# Patient Record
Sex: FEMALE | Race: WHITE | Employment: OTHER | URBAN - METROPOLITAN AREA
[De-identification: names, ages, dates, MRNs, and addresses within clinical notes are randomized per-mention and may not be internally consistent; named-entity substitution may affect disease eponyms.]

---

## 2020-10-27 PROBLEM — Z90.710 H/O TOTAL HYSTERECTOMY: Status: ACTIVE | Noted: 2018-01-29

## 2020-10-27 PROBLEM — N32.81 OAB (OVERACTIVE BLADDER): Status: ACTIVE | Noted: 2020-10-27

## 2020-10-27 PROBLEM — K63.5 POLYP OF COLON: Status: ACTIVE | Noted: 2018-12-13

## 2020-10-27 PROBLEM — M85.80 OSTEOPENIA: Status: ACTIVE | Noted: 2020-10-27

## 2020-10-27 PROBLEM — I25.10 CORONARY ARTERY DISEASE INVOLVING NATIVE CORONARY ARTERY OF NATIVE HEART WITHOUT ANGINA PECTORIS: Status: ACTIVE | Noted: 2020-10-27

## 2020-10-27 PROBLEM — J43.2 CENTRILOBULAR EMPHYSEMA (HCC): Status: ACTIVE | Noted: 2019-11-05

## 2020-10-27 PROBLEM — E78.5 HYPERLIPIDEMIA: Status: ACTIVE | Noted: 2020-10-27

## 2020-10-27 RX ORDER — ATORVASTATIN CALCIUM 40 MG/1
40 TABLET, FILM COATED ORAL DAILY
COMMUNITY
Start: 2020-10-18 | End: 2021-04-05 | Stop reason: SDUPTHER

## 2020-10-27 RX ORDER — OXYBUTYNIN CHLORIDE 15 MG/1
15 TABLET, EXTENDED RELEASE ORAL EVERY EVENING
COMMUNITY
Start: 2020-10-05

## 2020-10-28 ENCOUNTER — OFFICE VISIT (OUTPATIENT)
Dept: PRIMARY CARE CLINIC | Age: 65
End: 2020-10-28
Payer: COMMERCIAL

## 2020-10-28 VITALS
SYSTOLIC BLOOD PRESSURE: 158 MMHG | WEIGHT: 132.8 LBS | HEART RATE: 74 BPM | BODY MASS INDEX: 23.53 KG/M2 | HEIGHT: 63 IN | RESPIRATION RATE: 16 BRPM | TEMPERATURE: 97.2 F | DIASTOLIC BLOOD PRESSURE: 87 MMHG

## 2020-10-28 PROCEDURE — 90471 IMMUNIZATION ADMIN: CPT | Performed by: FAMILY MEDICINE

## 2020-10-28 PROCEDURE — 90686 IIV4 VACC NO PRSV 0.5 ML IM: CPT | Performed by: FAMILY MEDICINE

## 2020-10-28 PROCEDURE — 99204 OFFICE O/P NEW MOD 45 MIN: CPT | Performed by: FAMILY MEDICINE

## 2020-10-28 RX ORDER — PNV NO.95/FERROUS FUM/FOLIC AC 28MG-0.8MG
1 TABLET ORAL DAILY
COMMUNITY

## 2020-10-28 RX ORDER — ALBUTEROL SULFATE 90 UG/1
2 AEROSOL, METERED RESPIRATORY (INHALATION) EVERY 4 HOURS PRN
COMMUNITY
Start: 2019-11-05 | End: 2021-12-15

## 2020-10-28 RX ORDER — ASPIRIN 81 MG/1
81 TABLET, CHEWABLE ORAL DAILY
COMMUNITY

## 2020-10-28 RX ORDER — DOXYCYCLINE 100 MG/1
100 TABLET ORAL EVERY 12 HOURS
COMMUNITY
Start: 2020-10-05 | End: 2022-10-14

## 2020-10-28 ASSESSMENT — ENCOUNTER SYMPTOMS
DIARRHEA: 0
BLOOD IN STOOL: 0
NAUSEA: 0
SINUS PAIN: 0
ABDOMINAL PAIN: 0
SINUS PRESSURE: 0
SHORTNESS OF BREATH: 0
CHEST TIGHTNESS: 0
WHEEZING: 0
CONSTIPATION: 0
SORE THROAT: 0
BACK PAIN: 0
RHINORRHEA: 0
VOMITING: 0
COUGH: 0

## 2020-10-28 NOTE — PROGRESS NOTES
Chief Complaint   Patient presents with    Establish Care         HPI:  Gagan Frazier is a 63 y.o. (: 1955) here today to establish care. H/o CAD, HLD, OAB, h/o tobacco use and lung nodules, emphysema and rosacea. Currently no concerns, would just like to establish and have a PCP as well as update HM. Denies CP, pressure, wheezing, SOB and abd pains. Believes she is due for her LDCT given smoking history and stable lung nodules. Last CT 2019. Urinary symptoms given OAB are tempered by oxybutynin. She urinates about 2x/night. Takes ASA, statin and fish oil to help decrease risk of MI and stroke. Believes that her rosacea is not getting better after trying the oral vitamins and topical meds. The ASCVD Risk score (Villa Lin, et al., 2013) failed to calculate for the following reasons:    Cannot find a previous HDL lab    Cannot find a previous total cholesterol lab    Review of Systems   Constitutional: Negative for activity change, appetite change, chills, fatigue, fever and unexpected weight change. HENT: Negative for congestion, postnasal drip, rhinorrhea, sinus pressure, sinus pain, sneezing and sore throat. Eyes: Negative for visual disturbance. Respiratory: Negative for cough, chest tightness, shortness of breath and wheezing. Cardiovascular: Negative for chest pain and palpitations. Gastrointestinal: Negative for abdominal pain, blood in stool, constipation, diarrhea, nausea and vomiting. Endocrine: Negative for cold intolerance, heat intolerance, polydipsia and polyuria. Genitourinary: Negative for dysuria, frequency, vaginal bleeding and vaginal discharge. Musculoskeletal: Negative for arthralgias, back pain, joint swelling, myalgias and neck pain. Skin: Negative for rash and wound. Allergic/Immunologic: Negative for environmental allergies.    Neurological: Negative for dizziness, tremors, syncope, weakness, light-headedness, numbness and headaches. Hematological: Negative for adenopathy. Psychiatric/Behavioral: Negative for behavioral problems, decreased concentration, sleep disturbance and suicidal ideas. The patient is not nervous/anxious. No past medical history on file. No family history on file. Social History     Tobacco Use    Smoking status: Former Smoker    Smokeless tobacco: Never Used   Substance Use Topics    Alcohol use: Not on file    Drug use: Not on file       New Prescriptions    No medications on file       Meds Prior to visit:  Current Outpatient Medications on File Prior to Visit   Medication Sig Dispense Refill    Ascorbic Acid (VITAMIN C PO) Take 1,000 mg by mouth daily      aspirin 81 MG chewable tablet Take 81 mg by mouth daily      albuterol sulfate  (90 Base) MCG/ACT inhaler Inhale 2 puffs into the lungs every 4 hours as needed      Calcium Carb-Cholecalciferol 600-800 MG-UNIT TABS Take 1 tablet by mouth daily      doxycycline monohydrate (ADOXA) 100 MG tablet Take 100 mg by mouth every 12 hours      Multiple Vitamins-Minerals (MULTIVITAMIN ADULT PO) Take 1 tablet by mouth daily      Omega-3 Fatty Acids (FISH OIL OMEGA-3) 1000 MG CAPS Take 1 capsule by mouth daily      cranberry 475 MG CAPS per capsule Take 4,200 mg by mouth daily      oxybutynin (DITROPAN XL) 15 MG extended release tablet Take 15 mg by mouth every evening      atorvastatin (LIPITOR) 40 MG tablet Take 40 mg by mouth daily       No current facility-administered medications on file prior to visit. Allergies   Allergen Reactions    Morphine And Related      vomiting         OBJECTIVE:  BP (!) 158/87   Pulse 74   Temp 97.2 °F (36.2 °C)   Resp 16   Ht 5' 2.5\" (1.588 m)   Wt 132 lb 12.8 oz (60.2 kg)   Breastfeeding No   BMI 23.90 kg/m²   BP Readings from Last 2 Encounters:   10/28/20 (!) 158/87     Wt Readings from Last 3 Encounters:   10/28/20 132 lb 12.8 oz (60.2 kg)       Physical Exam  Vitals signs reviewed. Motor: No weakness or abnormal muscle tone. Coordination: Coordination normal.      Gait: Gait normal.      Deep Tendon Reflexes: Reflexes normal.   Psychiatric:         Mood and Affect: Mood normal.         Behavior: Behavior normal.         Thought Content: Thought content normal.         Judgment: Judgment normal.           ASSESSMENT/PLAN:  1. Encounter to establish care  All questions answered. F/u discussed. Healthy lifestyle modifications discussed. 2. Physical exam  Update lipid panel and metabolic panel  VS reviewed and WNL    BMI reviewed   - Lipid Panel; Future  - Comprehensive Metabolic Panel; Future    3. Coronary artery disease involving native coronary artery of native heart without angina pectoris  Continue daily aspirin, statin and fish oil. Update renal function  - atorvastatin (LIPITOR) 40 MG tablet; Take 40 mg by mouth daily  - Comprehensive Metabolic Panel; Future  - aspirin 81 MG chewable tablet; Take 81 mg by mouth daily  - Omega-3 Fatty Acids (FISH OIL OMEGA-3) 1000 MG CAPS; Take 1 capsule by mouth daily    4. Mixed hyperlipidemia  Update lipid panel  Continue statin aspirin and fish oil  - atorvastatin (LIPITOR) 40 MG tablet; Take 40 mg by mouth daily  - Lipid Panel; Future  - aspirin 81 MG chewable tablet; Take 81 mg by mouth daily  - Omega-3 Fatty Acids (FISH OIL OMEGA-3) 1000 MG CAPS; Take 1 capsule by mouth daily    5. Lipid screening  Update lipid panel  - Lipid Panel; Future    6. OAB (overactive bladder)  Continue oxybutynin as prescribed. Briefly discussed increasing dose but she says she is fine at 50 mg  - oxybutynin (DITROPAN XL) 15 MG extended release tablet; Take 15 mg by mouth every evening    7. History of tobacco use  Does not smoke anymore. History of 2 lung nodules.   Per CT lungs on 9/13/2019 in care everywhere tab from [facility]:  NODULES, RIGHT:   *  Stable 3 mm subpleural nodule right middle lobe (series 2 image 198)  NODULES, LEFT:   *  Stable 4 mm nodule in the left upper lobe (image 101)  We will update LDCT per recommendations. - CT CHEST LOW DOSE (LDCT); Future    8. Lung nodules  As above  - CT CHEST LOW DOSE (LDCT); Future    9. Centrilobular emphysema ([address]  As above. Will refill albuterol  - albuterol sulfate  (90 Base) MCG/ACT inhaler; Inhale 2 puffs into the lungs every 4 hours as needed    10. Rosacea  Continue oral agents. Offered dermatology referral but politely declined at this time. - Calcium Carb-Cholecalciferol 600-800 MG-UNIT TABS; Take 1 tablet by mouth daily  - doxycycline monohydrate (ADOXA) 100 MG tablet; Take 100 mg by mouth every 12 hours    11. Immunization due  Administered  - INFLUENZA, QUADV, 3 YRS AND OLDER, IM PF, PREFILL SYR OR SDV, 0.5ML (AFLURIA QUADV, PF)    Discussed use, benefit, and side effects of prescribed medications. Barriers to medication compliance addressed. All patient questions answered. Pt voiced understanding. RTC Return in about 1 year (around 10/28/2021) for Physical .    No future appointments.     Kenzie Cabrera MD  10/28/2020  12:48 PM

## 2020-10-28 NOTE — PROGRESS NOTES
Vaccine Information Sheet, \"Influenza - Inactivated\"  given to Destiny Becerril, or parent/legal guardian of  Destiny Becerril and verbalized understanding. Patient responses:    Have you ever had a reaction to a flu vaccine? No  Do you have any current illness? No  Have you ever had Guillian [place] Syndrome? No  Do you have a serious allergy to any of the follow: Neomycin, Polymyxin, Thimerosal, eggs or egg products? No    Flu vaccine given per order. Please see immunization tab. Risks and benefits explained. Current VIS given.       Immunizations Administered     Name Date Dose Route    Influenza, Quadv, IM, PF (6 mo and older Fluzone, Flulaval, Fluarix, and 3 yrs and older Afluria) 10/28/2020 0.5 mL Intramuscular    Site: Deltoid- Left    Lot: [id number]    NDC: 06977-815-47

## 2020-10-30 ENCOUNTER — CLINICAL DOCUMENTATION (OUTPATIENT)
Dept: PRIMARY CARE CLINIC | Age: 65
End: 2020-10-30

## 2020-10-31 SDOH — HEALTH STABILITY: MENTAL HEALTH: HOW OFTEN DO YOU HAVE A DRINK CONTAINING ALCOHOL?: NEVER

## 2020-11-03 ENCOUNTER — TELEPHONE (OUTPATIENT)
Dept: PRIMARY CARE CLINIC | Age: 65
End: 2020-11-03

## 2020-11-03 NOTE — TELEPHONE ENCOUNTER
Pt called stating that a PA needs to be completed to do the CT CHEST LOW DOSE (LDCT) (Order [id number])     She said we need to start the process before she can have the scan done.

## 2020-11-13 ENCOUNTER — TELEPHONE (OUTPATIENT)
Dept: GYNECOLOGY | Age: 65
End: 2020-11-13

## 2020-11-13 NOTE — TELEPHONE ENCOUNTER
----- Message from Arvind Tran MD sent at 11/11/2020  4:52 PM EST -----  Regarding: Lab results  Please call patient with results:    - metabolic panel reveals normal electrolytes, kidney function and liver function. - lipid panel shows pretty good numbers for total cholesterol and LDL cholesterol.   - The HDL cholesterol is 39. This number we want to be a little higher. Walking 30 minutes, 3 times per week can help this. - alkaline phosphatase is mildly elevated. This can be due to many things like calcium metabolism and your liver/gallbladder.  - Because your liver and calcium are normal, we can continue to monitor this. - if you choose, the other option is to fractionate this number to see if this mild elevation is due to bone breakdown or your liver. This would be another blood test.   - otherwise we will recheck in 3 months. We can mail you your results if you'd like. Thank you!   Dr. Antonietta Ramos

## 2020-11-17 ENCOUNTER — TELEPHONE (OUTPATIENT)
Dept: PRIMARY CARE CLINIC | Age: 65
End: 2020-11-17

## 2020-11-18 ENCOUNTER — HOSPITAL ENCOUNTER (OUTPATIENT)
Dept: CT IMAGING | Age: 65
Discharge: HOME | End: 2020-11-18
Payer: COMMERCIAL

## 2020-11-18 PROCEDURE — 71250 CT THORAX DX C-: CPT

## 2020-11-19 NOTE — TELEPHONE ENCOUNTER
Called [employer]. Code for claim was not filed properly. It was made for a regular CT chest instead of a LDCT for screening. A new claim was made and approved. New case number: [id number]  Approval number: [id number]    Please let patient know this. Thank you so much!     Dr. Lana Khoury

## 2020-11-19 NOTE — TELEPHONE ENCOUNTER
Patient had CT Chest done yesterday and her insurance is requesting a peer to peer before they will approve the authorization request. Please contact [employer] at [phone], case # [id number]. Thank you.

## 2021-04-05 DIAGNOSIS — E78.2 MIXED HYPERLIPIDEMIA: ICD-10-CM

## 2021-04-05 DIAGNOSIS — I25.10 CORONARY ARTERY DISEASE INVOLVING NATIVE CORONARY ARTERY OF NATIVE HEART WITHOUT ANGINA PECTORIS: ICD-10-CM

## 2021-04-05 RX ORDER — ATORVASTATIN CALCIUM 40 MG/1
40 TABLET, FILM COATED ORAL DAILY
Qty: 30 TABLET | Refills: 0 | Status: SHIPPED | OUTPATIENT
Start: 2021-04-05 | End: 2021-04-12 | Stop reason: SDUPTHER

## 2021-04-05 NOTE — TELEPHONE ENCOUNTER
atorvastatin (LIPITOR) 40 MG tablet     [address], [address] [facility] - [address] [place] [address]

## 2021-04-12 DIAGNOSIS — I25.10 CORONARY ARTERY DISEASE INVOLVING NATIVE CORONARY ARTERY OF NATIVE HEART WITHOUT ANGINA PECTORIS: ICD-10-CM

## 2021-04-12 DIAGNOSIS — E78.2 MIXED HYPERLIPIDEMIA: ICD-10-CM

## 2021-04-12 RX ORDER — ATORVASTATIN CALCIUM 40 MG/1
40 TABLET, FILM COATED ORAL DAILY
Qty: 90 TABLET | Refills: 3 | Status: SHIPPED | OUTPATIENT
Start: 2021-04-12 | End: 2021-05-04

## 2021-05-04 RX ORDER — ATORVASTATIN CALCIUM 40 MG/1
40 TABLET, FILM COATED ORAL DAILY
Qty: 90 TABLET | Refills: 1 | Status: SHIPPED | OUTPATIENT
Start: 2021-05-04 | End: 2021-10-27 | Stop reason: SDUPTHER

## 2021-05-14 ENCOUNTER — TELEPHONE (OUTPATIENT)
Dept: PRIMARY CARE CLINIC | Age: 66
End: 2021-05-14

## 2021-05-14 DIAGNOSIS — R21 RASH: Primary | ICD-10-CM

## 2021-05-14 NOTE — TELEPHONE ENCOUNTER
Pt said she has had a rash for  A week now, she used hydrocortisone cream over the counter but its not working she would like to know if there is something you can call in for it   [address], [address] [facility] -  [phone]

## 2021-05-15 NOTE — TELEPHONE ENCOUNTER
Please let Denae Oneill know I sent some stronger steroid cream in for her. If this doesn't help, she should probably make an appt. Thank you!   Dr. Asia Edmond

## 2021-05-20 ENCOUNTER — TELEPHONE (OUTPATIENT)
Dept: PRIMARY CARE CLINIC | Age: 66
End: 2021-05-20

## 2021-05-20 NOTE — TELEPHONE ENCOUNTER
Tried to schedule patient for openings on Friday Dr Guanaco Bailey has, but patient can't do Virtual Visit or Office visit. Patient stated she is going to wait and use the cream she has now over the weekend. If it is not better, she will call the office Monday.

## 2021-08-26 NOTE — PROGRESS NOTES
visual disturbance. Respiratory: Negative for cough and shortness of breath. Cardiovascular: Negative for chest pain and palpitations. Gastrointestinal: Negative for abdominal pain, constipation and diarrhea. Genitourinary: Negative for difficulty urinating. Musculoskeletal: Negative for arthralgias. Skin: Negative for rash and wound. Neurological: Negative for dizziness, weakness, light-headedness and headaches. Hematological: Does not bruise/bleed easily. Psychiatric/Behavioral: Negative for behavioral problems. Prior to Visit Medications    Medication Sig Taking?  Authorizing Provider   atorvastatin (LIPITOR) 40 MG tablet Take 1 tablet by mouth daily  [place] Ventura MD   Ascorbic Acid (VITAMIN C PO) Take 1,000 mg by mouth daily  Historical Provider, MD   aspirin 81 MG chewable tablet Take 81 mg by mouth daily  Historical Provider, MD   albuterol sulfate  (90 Base) MCG/ACT inhaler Inhale 2 puffs into the lungs every 4 hours as needed  Historical Provider, MD   Calcium Carb-Cholecalciferol 600-800 MG-UNIT TABS Take 1 tablet by mouth daily  Historical Provider, MD   doxycycline monohydrate (ADOXA) 100 MG tablet Take 100 mg by mouth every 12 hours  Historical Provider, MD   Multiple Vitamins-Minerals (MULTIVITAMIN ADULT PO) Take 1 tablet by mouth daily  Historical Provider, MD   Omega-3 Fatty Acids (FISH OIL OMEGA-3) 1000 MG CAPS Take 1 capsule by mouth daily  Historical Provider, MD   cranberry 475 MG CAPS per capsule Take 4,200 mg by mouth daily  Historical Provider, MD   oxybutynin (DITROPAN XL) 15 MG extended release tablet Take 15 mg by mouth every evening  Historical Provider, MD       Social History     Tobacco Use    Smoking status: Former Smoker     Years: 35.00     Types: Cigarettes     Start date: 1973     Quit date: 2013     Years since quittin.2    Smokeless tobacco: Never Used   Substance Use Topics    Alcohol use: Never    Drug use: Never Allergies   Allergen Reactions    Morphine And Related      vomiting     ,   Past Medical History:   Diagnosis Date    CAD (coronary artery disease)     Angiogram 2016    Endometriosis     Former smoker     Hyperlipidemia, mixed     OAB (overactive bladder)     Osteopenia     ; Rpt DEXA    ,   Past Surgical History:   Procedure Laterality Date    CATARACT REMOVAL Right 2019     SECTION      DILATION AND CURETTAGE OF UTERUS      PARTIAL HYSTERECTOMY      2/2 postpartum bleeding   ,   Social History     Tobacco Use    Smoking status: Former Smoker     Years: 35.00     Types: Cigarettes     Start date: 1973     Quit date: 2013     Years since quittin.2    Smokeless tobacco: Never Used   Substance Use Topics    Alcohol use: Never    Drug use: Never   ,   Family History   Problem Relation Age of Onset    Lung Cancer Father 45    Atrial Fibrillation Brother    ,   Immunization History   Administered Date(s) Administered    COVID-19, J&J, PF, 0.5 mL 2021    Influenza Virus Vaccine 10/01/2007, 10/01/2008, 2011, 2013, 10/27/2014, 2015, 2016, 2017    Influenza Whole 2011    Influenza, Quadv, IM, PF (6 mo and older Fluzone, Flulaval, Fluarix, and 3 yrs and older Afluria) 10/28/2020    Pneumococcal Polysaccharide (Benloupdh46) 2017    Tdap (Boostrix, Adacel) 2012    Zoster Live (Zostavax) 10/13/2017   ,   Health Maintenance   Topic Date Due    Hepatitis C screen  Never done    HIV screen  Never done    Lipid screen  Never done    Shingles Vaccine (3 of 3) 2021    Flu vaccine (1) 2021    Breast cancer screen  2022    DTaP/Tdap/Td vaccine (2 - Td or Tdap) 2022    Pneumococcal 0-64 years Vaccine (2 of 2 - PPSV23) 2022    Colon cancer screen colonoscopy  2031    COVID-19 Vaccine  Completed    Hepatitis A vaccine  Aged Out    Hepatitis B vaccine  Aged Out    Hib vaccine Aged Out    Meningococcal (ACWY) vaccine  Aged Out       PHYSICAL EXAMINATION:  [ INSTRUCTIONS:  \"[x]\" Indicates a positive item  \"[]\" Indicates a negative item  -- DELETE ALL ITEMS NOT EXAMINED]  [x] Alert  [x] Oriented to person/place/time    [x] No apparent distress  [] Toxic appearing    [] Face flushed appearing [x] Sclera clear  [] Lips are cyanotic      [x] Breathing appears normal  [] Appears tachypneic      [] Rash on visible skin    [x] Cranial Nerves II-XII grossly intact    [] Motor grossly intact in visible upper extremities    [] Motor grossly intact in visible lower extremities    [x] Normal Mood  [] Anxious appearing    [] Depressed appearing  [] Confused appearing        Due to this being a TeleHealth encounter, evaluation of the following organ systems is limited: Vitals/Constitutional/EENT/Resp/CV/GI//MS/Neuro/Skin/Heme-Lymph-Imm. ASSESSMENT/PLAN:  1. SOB (shortness of breath)  History of shortness of breath, diagnosed with emphysema/COPD in 2019 per PFT and it was recommended for her to follow-up in 1 year for repeat PFTs. Has never used the albuterol that she was previously prescribed because she did not think she needed it  Since her shortness of breath is increasing in severity and frequency, she would like a new referral to a [facility] pulmonologist.  - Matias Sumner MD, Pulmonary, [facility]    2. Centrilobular emphysema (HCC)  As above  Shortness of breath likely due to this. Referral placed  Politely offered albuterol refill but she declined and would like to follow-up with pulmonology first  - Matias Sumner MD, Pulmonary, [facility]    3. Pulmonary nodules  Stable her last pulmonary visit on CT chest  Recommended follow-up  Referral placed  We will follow up recommendations  - Matias Sumner MD, Pulmonary, [facility]    4. Nausea  Offered Zofran but patient politely declined. She is not too worried about this.   We will try to make

## 2021-08-27 ENCOUNTER — VIRTUAL VISIT (OUTPATIENT)
Dept: PRIMARY CARE CLINIC | Age: 66
End: 2021-08-27
Payer: COMMERCIAL

## 2021-08-27 DIAGNOSIS — R11.0 NAUSEA: ICD-10-CM

## 2021-08-27 DIAGNOSIS — R06.02 SOB (SHORTNESS OF BREATH): Primary | ICD-10-CM

## 2021-08-27 DIAGNOSIS — J43.2 CENTRILOBULAR EMPHYSEMA (HCC): ICD-10-CM

## 2021-08-27 DIAGNOSIS — R91.8 PULMONARY NODULES: ICD-10-CM

## 2021-08-27 PROCEDURE — 3017F COLORECTAL CA SCREEN DOC REV: CPT | Performed by: FAMILY MEDICINE

## 2021-08-27 PROCEDURE — VIRTUALHLTH VIRTUAL HEALTH SAME DAY: Performed by: FAMILY MEDICINE

## 2021-08-27 PROCEDURE — 1036F TOBACCO NON-USER: CPT | Performed by: FAMILY MEDICINE

## 2021-08-27 PROCEDURE — 3023F SPIROM DOC REV: CPT | Performed by: FAMILY MEDICINE

## 2021-08-27 PROCEDURE — G8926 SPIRO NO PERF OR DOC: HCPCS | Performed by: FAMILY MEDICINE

## 2021-08-27 PROCEDURE — G8420 CALC BMI NORM PARAMETERS: HCPCS | Performed by: FAMILY MEDICINE

## 2021-08-27 PROCEDURE — G8427 DOCREV CUR MEDS BY ELIG CLIN: HCPCS | Performed by: FAMILY MEDICINE

## 2021-08-27 PROCEDURE — 99214 OFFICE O/P EST MOD 30 MIN: CPT | Performed by: FAMILY MEDICINE

## 2021-08-27 ASSESSMENT — ENCOUNTER SYMPTOMS
CONSTIPATION: 0
COUGH: 0
ABDOMINAL PAIN: 0
EYE PAIN: 0
DIARRHEA: 0
SHORTNESS OF BREATH: 0

## 2021-09-16 ENCOUNTER — OFFICE VISIT (OUTPATIENT)
Dept: PULMONOLOGY | Age: 66
End: 2021-09-16
Payer: COMMERCIAL

## 2021-09-16 VITALS
OXYGEN SATURATION: 95 % | BODY MASS INDEX: 24.27 KG/M2 | TEMPERATURE: 96.5 F | DIASTOLIC BLOOD PRESSURE: 66 MMHG | HEIGHT: 63 IN | WEIGHT: 137 LBS | SYSTOLIC BLOOD PRESSURE: 130 MMHG | HEART RATE: 71 BPM

## 2021-09-16 DIAGNOSIS — R91.8 PULMONARY NODULES: ICD-10-CM

## 2021-09-16 DIAGNOSIS — R06.09 DOE (DYSPNEA ON EXERTION): Primary | ICD-10-CM

## 2021-09-16 DIAGNOSIS — J44.9 COPD, MILD (HCC): ICD-10-CM

## 2021-09-16 LAB
ALBUMIN SERPL-MCNC: 4.2 G/DL (ref 3.4–5)
ANION GAP SERPL CALCULATED.3IONS-SCNC: 15 MMOL/L (ref 3–16)
BASOPHILS ABSOLUTE: 0.1 K/UL (ref 0–0.2)
BASOPHILS RELATIVE PERCENT: 1 %
BUN BLDV-MCNC: 14 MG/DL (ref 7–20)
CALCIUM SERPL-MCNC: 9.7 MG/DL (ref 8.3–10.6)
CHLORIDE BLD-SCNC: 101 MMOL/L (ref 99–110)
CO2: 23 MMOL/L (ref 21–32)
CREAT SERPL-MCNC: 0.7 MG/DL (ref 0.6–1.2)
EOSINOPHILS ABSOLUTE: 0.2 K/UL (ref 0–0.6)
EOSINOPHILS RELATIVE PERCENT: 3.6 %
GFR AFRICAN AMERICAN: >60
GFR NON-AFRICAN AMERICAN: >60
GLUCOSE BLD-MCNC: 92 MG/DL (ref 70–99)
HCT VFR BLD CALC: 39.8 % (ref 36–48)
HEMOGLOBIN: 13 G/DL (ref 12–16)
LYMPHOCYTES ABSOLUTE: 1.6 K/UL (ref 1–5.1)
LYMPHOCYTES RELATIVE PERCENT: 25.8 %
MCH RBC QN AUTO: 28.2 PG (ref 26–34)
MCHC RBC AUTO-ENTMCNC: 32.8 G/DL (ref 31–36)
MCV RBC AUTO: 85.9 FL (ref 80–100)
MONOCYTES ABSOLUTE: 0.8 K/UL (ref 0–1.3)
MONOCYTES RELATIVE PERCENT: 13.2 %
NEUTROPHILS ABSOLUTE: 3.4 K/UL (ref 1.7–7.7)
NEUTROPHILS RELATIVE PERCENT: 56.4 %
PDW BLD-RTO: 15.9 % (ref 12.4–15.4)
PHOSPHORUS: 3.7 MG/DL (ref 2.5–4.9)
PLATELET # BLD: 258 K/UL (ref 135–450)
PMV BLD AUTO: 9 FL (ref 5–10.5)
POTASSIUM SERPL-SCNC: 4.1 MMOL/L (ref 3.5–5.1)
RBC # BLD: 4.63 M/UL (ref 4–5.2)
SODIUM BLD-SCNC: 139 MMOL/L (ref 136–145)
TSH SERPL DL<=0.05 MIU/L-ACNC: 2.6 UIU/ML (ref 0.27–4.2)
WBC # BLD: 6.1 K/UL (ref 4–11)

## 2021-09-16 PROCEDURE — 3023F SPIROM DOC REV: CPT | Performed by: INTERNAL MEDICINE

## 2021-09-16 PROCEDURE — G8427 DOCREV CUR MEDS BY ELIG CLIN: HCPCS | Performed by: INTERNAL MEDICINE

## 2021-09-16 PROCEDURE — 1036F TOBACCO NON-USER: CPT | Performed by: INTERNAL MEDICINE

## 2021-09-16 PROCEDURE — 99204 OFFICE O/P NEW MOD 45 MIN: CPT | Performed by: INTERNAL MEDICINE

## 2021-09-16 PROCEDURE — G8926 SPIRO NO PERF OR DOC: HCPCS | Performed by: INTERNAL MEDICINE

## 2021-09-16 PROCEDURE — 3017F COLORECTAL CA SCREEN DOC REV: CPT | Performed by: INTERNAL MEDICINE

## 2021-09-16 PROCEDURE — G8420 CALC BMI NORM PARAMETERS: HCPCS | Performed by: INTERNAL MEDICINE

## 2021-09-16 ASSESSMENT — ENCOUNTER SYMPTOMS
SHORTNESS OF BREATH: 1
WHEEZING: 0
CHEST TIGHTNESS: 0
COUGH: 0

## 2021-09-16 NOTE — PROGRESS NOTES
[facility]    Outpatient Note    Subjective:   CHIEF COMPLAINT: No chief complaint on file. HPI:     The patient is 61 y.o. female who presents today for a new patient visit for evaluation of SOB. It is mainly with exertion. It started 2 years ago. At that time she was found to have nodules. Over the past few months she noticed more SOB with carrying stuff or going up steps. There is no cough or sputum production. There is no fever or chills. There is no chest pain or palpitations. Otherwise she feels good. No prior hx of heart disease, anemia or thyroid disease. Several years she had pneumonia. She was not admitted for it. She quit smoking in . She started smoking at the age of 14 and smoked 1 PPD. She can walk 3 flights of stairs but gets really SOB and has to sit.       Past Medical History:    Past Medical History:   Diagnosis Date    CAD (coronary artery disease)     Angiogram     Endometriosis     Former smoker     Hyperlipidemia, mixed     OAB (overactive bladder)     Osteopenia     ; Rpt DEXA        Social History:    Social History     Tobacco Use   Smoking Status Former Smoker    Years: 35.00    Types: Cigarettes    Start date: 1973   Jesse Johnson Quit date: 2013    Years since quittin.2   Smokeless Tobacco Never Used       Family History:  Family History   Problem Relation Age of Onset    Lung Cancer Father 45    Atrial Fibrillation Brother        Current Medications:  Current Outpatient Medications on File Prior to Visit   Medication Sig Dispense Refill    Ascorbic Acid (VITAMIN C PO) Take 1,000 mg by mouth daily      aspirin 81 MG chewable tablet Take 81 mg by mouth daily      Calcium Carb-Cholecalciferol 600-800 MG-UNIT TABS Take 1 tablet by mouth daily      doxycycline monohydrate (ADOXA) 100 MG tablet Take 100 mg by mouth every 12 hours      Multiple Vitamins-Minerals (MULTIVITAMIN ADULT PO) Take 1 tablet by mouth daily      Omega-3 Fatty Acids (FISH OIL OMEGA-3) 1000 MG CAPS Take 1 capsule by mouth daily      cranberry 475 MG CAPS per capsule Take 4,200 mg by mouth daily      oxybutynin (DITROPAN XL) 15 MG extended release tablet Take 15 mg by mouth every evening      atorvastatin (LIPITOR) 40 MG tablet Take 1 tablet by mouth daily 90 tablet 1    albuterol sulfate  (90 Base) MCG/ACT inhaler Inhale 2 puffs into the lungs every 4 hours as needed (Patient not taking: Reported on 9/16/2021)       No current facility-administered medications on file prior to visit. REVIEW OF SYSTEMS:    Review of Systems   Constitutional: Negative for chills, fatigue, fever and unexpected weight change. HENT: Negative for congestion. Respiratory: Positive for shortness of breath (on exertion ). Negative for cough, chest tightness and wheezing. Cardiovascular: Negative for chest pain, palpitations and leg swelling. Neurological: Negative for weakness. Psychiatric/Behavioral: Negative for sleep disturbance. The patient is not nervous/anxious. All other systems reviewed and are negative. Objective:   PHYSICAL EXAM:        VITALS:  /66 (Site: Right Upper Arm, Position: Sitting, Cuff Size: Medium Adult)   Pulse 71   Temp 96.5 °F (35.8 °C) (Infrared)   Ht 5' 3\" (1.6 m)   Wt 137 lb (62.1 kg)   SpO2 95%   Breastfeeding No   BMI 24.27 kg/m²     Physical Exam  Vitals reviewed. Constitutional:       Appearance: She is well-developed. HENT:      Head: Normocephalic and atraumatic. Eyes:      Pupils: Pupils are equal, round, and reactive to light. Neck:      Vascular: No JVD. Cardiovascular:      Rate and Rhythm: Normal rate and regular rhythm. Heart sounds: No murmur heard. Pulmonary:      Effort: Pulmonary effort is normal. No respiratory distress. Breath sounds: Normal breath sounds. No stridor. No wheezing or rales.    Abdominal:      General: Bowel sounds are normal.      Palpations: Abdomen is soft. Musculoskeletal:         General: No deformity. Cervical back: Neck supple. Right lower leg: No edema. Left lower leg: No edema. Skin:     General: Skin is warm and dry. Neurological:      Mental Status: She is alert and oriented to person, place, and time. Psychiatric:         Behavior: Behavior normal.         DATA:    11/18/20  1. Category 2, benign appearance or behavior.  Continue annual screening in 12 months.       LUNG RADS ASSESSMENTS       LUNG RADS S Modifier     PFT on 10/11/2019  5:19 PM   PFT TEST     Spirometry   Spirometry shows mild obstructive defect. .     Bronchodilator   There is no response to bronchodilator demonstrated. Flow-Volume Loop   The flow-volume loop is compatible with obstructive lung defect. Lung Volumes   Lung volumes are normal.     Lung Volume Comments   There is no air trapping or hyperinflation present. Diffusing Capacity   DLCO is  moderately reduced even when adjusted for [place]. Airway Resistance   Airway resistance is within normal limits. PFT is compatible with MILD     Obstructive Lung Disease  - Possible etiologies include: asthma,   COPD, bronchiectasis, bronchitis. Correlate clinically. Assessment:      Diagnosis Orders   1. BARON (dyspnea on exertion)  CBC Auto Differential    Renal Function Panel    TSH without Reflex   2. COPD, mild ([address]  Full PFT Study With Bronchodilator    Carbon Monoxide Diffusing Capacity   3. Pulmonary nodules  CT Lung Screen (Initial or Annual)       Plan:   61year old female, ex smoker, quit smoking in 2013 is here for evaluation of BARON. She noticed it 1st two years ago, however over the past few months it appears to be more than usual.      Prior PFT on 10/2019 with mild obstruction. She was prescribed albuterol but she doesn't use it.     Prior CT scan in 2020 with stable pulmonary nodules    There is no chest pain or palpitations with exertion  No hx of anemia, renal disease or thyroid disease. Plan   PFT  LDCT.   If nodules are stable no further f/u required  CBC, BMP and TSH  If this work up is negative consider stress test

## 2021-09-20 DIAGNOSIS — J06.9 UPPER RESPIRATORY TRACT INFECTION, UNSPECIFIED TYPE: Primary | ICD-10-CM

## 2021-09-20 RX ORDER — AZITHROMYCIN 250 MG/1
250 TABLET, FILM COATED ORAL SEE ADMIN INSTRUCTIONS
Qty: 6 TABLET | Refills: 0 | Status: SHIPPED | OUTPATIENT
Start: 2021-09-20 | End: 2021-09-25

## 2021-09-21 ENCOUNTER — TELEPHONE (OUTPATIENT)
Dept: PRIMARY CARE CLINIC | Age: 66
End: 2021-09-21

## 2021-09-21 NOTE — TELEPHONE ENCOUNTER
----- Message from Kelsea Blackburn sent at 9/20/2021  8:39 AM EDT -----  Subject: Message to Provider    QUESTIONS  Information for Provider? Patient started over the weekend with stuffy   nose, sinus drainage, cough. No fever, diarrhea and ear pain this morning. [facility] [phone]  ---------------------------------------------------------------------------  --------------  Reilly SAMUELS  What is the best way for the office to contact you? OK to leave message on   voicemail  Preferred Call Back Phone Number? [phone]  ---------------------------------------------------------------------------  --------------  SCRIPT ANSWERS  Relationship to Patient?  Self

## 2021-09-28 ENCOUNTER — TELEPHONE (OUTPATIENT)
Dept: PULMONOLOGY | Age: 66
End: 2021-09-28

## 2021-09-28 NOTE — TELEPHONE ENCOUNTER
Julien from [employer] called to say that Iván Willingham is scheduled for CT Lung screening tomorrow and it was denied by insurance. If this needs to be done now you can do a peer to peer, he has the info. If it can wait 1 yr from last scan they will just cancel ans reschedule it. Please Advise. Julien can be reached at [phone].

## 2021-10-25 DIAGNOSIS — I25.10 CORONARY ARTERY DISEASE INVOLVING NATIVE CORONARY ARTERY OF NATIVE HEART WITHOUT ANGINA PECTORIS: ICD-10-CM

## 2021-10-25 DIAGNOSIS — E78.2 MIXED HYPERLIPIDEMIA: ICD-10-CM

## 2021-10-25 NOTE — TELEPHONE ENCOUNTER
atorvastatin (LIPITOR) 40 MG tablet     [facility], [address] [phone]

## 2021-10-27 RX ORDER — ATORVASTATIN CALCIUM 40 MG/1
40 TABLET, FILM COATED ORAL DAILY
Qty: 90 TABLET | Refills: 1 | Status: SHIPPED | OUTPATIENT
Start: 2021-10-27 | End: 2021-11-05 | Stop reason: SDUPTHER

## 2021-10-27 NOTE — TELEPHONE ENCOUNTER
Medication:   Requested Prescriptions     Pending Prescriptions Disp Refills    atorvastatin (LIPITOR) 40 MG tablet 90 tablet 1     Sig: Take 1 tablet by mouth daily        Last Filled:  05/04/21    Patient Phone Number: [phone] (home)     Last appt: 8/27/2021 No follow-ups on file. Next appt: Visit date not found    Last OARRS: No flowsheet data found.

## 2021-11-05 RX ORDER — ATORVASTATIN CALCIUM 40 MG/1
40 TABLET, FILM COATED ORAL DAILY
Qty: 90 TABLET | Refills: 1 | Status: SHIPPED | OUTPATIENT
Start: 2021-11-05 | End: 2022-05-11

## 2021-11-05 NOTE — TELEPHONE ENCOUNTER
Medication:   Requested Prescriptions     Pending Prescriptions Disp Refills    atorvastatin (LIPITOR) 40 MG tablet 90 tablet 1     Sig: Take 1 tablet by mouth daily     Signed Prescriptions Disp Refills    atorvastatin (LIPITOR) 40 MG tablet 90 tablet 1     Sig: Take 1 tablet by mouth daily     Authorizing Provider: Harlan Otoole        Last Filled:  Sent in to wrong pharm     Patient Phone Number: [phone] (home)     Last appt: 8/27/2021   Next appt: Visit date not found    Last OARRS: No flowsheet data found.

## 2021-11-10 ENCOUNTER — TELEPHONE (OUTPATIENT)
Dept: PULMONOLOGY | Age: 66
End: 2021-11-10

## 2021-11-10 DIAGNOSIS — R06.02 SOB (SHORTNESS OF BREATH): Primary | ICD-10-CM

## 2021-11-23 ENCOUNTER — HOSPITAL ENCOUNTER (OUTPATIENT)
Dept: CT IMAGING | Age: 66
Discharge: HOME | End: 2021-11-23
Payer: COMMERCIAL

## 2021-11-23 ENCOUNTER — HOSPITAL ENCOUNTER (OUTPATIENT)
Dept: PULMONOLOGY | Age: 66
Discharge: HOME | End: 2021-11-23
Payer: COMMERCIAL

## 2021-11-23 DIAGNOSIS — R91.8 PULMONARY NODULES: ICD-10-CM

## 2021-11-23 DIAGNOSIS — R06.02 SOB (SHORTNESS OF BREATH): ICD-10-CM

## 2021-11-23 PROCEDURE — 94060 EVALUATION OF WHEEZING: CPT

## 2021-11-23 PROCEDURE — 6360000002 HC RX W HCPCS: Performed by: INTERNAL MEDICINE

## 2021-11-23 PROCEDURE — 94726 PLETHYSMOGRAPHY LUNG VOLUMES: CPT

## 2021-11-23 PROCEDURE — 94729 DIFFUSING CAPACITY: CPT

## 2021-11-23 PROCEDURE — 71271 CT THORAX LUNG CANCER SCR C-: CPT

## 2021-11-23 PROCEDURE — 94761 N-INVAS EAR/PLS OXIMETRY MLT: CPT

## 2021-11-23 PROCEDURE — 94664 DEMO&/EVAL PT USE INHALER: CPT

## 2021-11-23 RX ORDER — ALBUTEROL SULFATE 2.5 MG/3ML
2.5 SOLUTION RESPIRATORY (INHALATION) ONCE
Status: COMPLETED | OUTPATIENT
Start: 2021-11-23 | End: 2021-11-23

## 2021-11-23 RX ADMIN — ALBUTEROL SULFATE 2.5 MG: 2.5 SOLUTION RESPIRATORY (INHALATION) at 15:03

## 2021-11-29 ENCOUNTER — TELEPHONE (OUTPATIENT)
Dept: CT IMAGING | Age: 66
End: 2021-11-29

## 2021-11-30 ENCOUNTER — TELEPHONE (OUTPATIENT)
Dept: PULMONOLOGY | Age: 66
End: 2021-11-30

## 2021-11-30 DIAGNOSIS — R06.09 DOE (DYSPNEA ON EXERTION): Primary | ICD-10-CM

## 2021-11-30 NOTE — PROCEDURES
[address]               [address]                               PULMONARY FUNCTION    PATIENT NAME: Samantha Arriaga                     :        1955  MED REC NO:   [id number]                          ROOM:  ACCOUNT NO:   [de-identified]                           ADMIT DATE: 2021  PROVIDER:     Godfrey Oconnell MD    DATE OF PROCEDURE:  2021    FINDINGS:  Spirometry on this patient shows an FEV1 of 1.78 which is 76%  of predicted and a forced vital capacity of 2.55 which is 83% of  predicted giving a ratio of 70. There was no significant response to  bronchodilators. Lung volumes show a normal total lung capacity at 82%. Diffusion capacity was moderately decreased even with correction for  alveolar ventilation. CONCLUSION:  Moderate obstructive defect based on lower limit of normal  values. No evidence of hyperinflation or air trapping and moderately  decreased diffusion. Findings could be consistent with a diagnosis of  COPD. Clinical correlation will be needed.         Brisa Olivares MD    D: 2021 16:37:43       T: 2021 18:47:17     [email or web address]  Job#: [id number]     Doc#: [id number]

## 2021-11-30 NOTE — TELEPHONE ENCOUNTER
Patient calling about results for PFT and CT scan. Does she need a follow up appt or are you able to call with results ?   Can be reached at [phone]

## 2021-12-15 ENCOUNTER — HOSPITAL ENCOUNTER (OUTPATIENT)
Age: 66
Discharge: HOME | End: 2021-12-15
Payer: COMMERCIAL

## 2021-12-15 ENCOUNTER — HOSPITAL ENCOUNTER (OUTPATIENT)
Dept: GENERAL RADIOLOGY | Age: 66
Discharge: HOME | End: 2021-12-15
Payer: COMMERCIAL

## 2021-12-15 ENCOUNTER — OFFICE VISIT (OUTPATIENT)
Dept: PRIMARY CARE CLINIC | Age: 66
End: 2021-12-15
Payer: COMMERCIAL

## 2021-12-15 VITALS
DIASTOLIC BLOOD PRESSURE: 59 MMHG | SYSTOLIC BLOOD PRESSURE: 121 MMHG | WEIGHT: 138.8 LBS | BODY MASS INDEX: 24.59 KG/M2 | HEART RATE: 80 BPM | TEMPERATURE: 92.5 F

## 2021-12-15 DIAGNOSIS — M65.341 TRIGGER RING FINGER OF RIGHT HAND: ICD-10-CM

## 2021-12-15 DIAGNOSIS — M79.641 RIGHT HAND PAIN: Primary | ICD-10-CM

## 2021-12-15 DIAGNOSIS — M79.641 RIGHT HAND PAIN: ICD-10-CM

## 2021-12-15 PROCEDURE — G8427 DOCREV CUR MEDS BY ELIG CLIN: HCPCS | Performed by: FAMILY MEDICINE

## 2021-12-15 PROCEDURE — 3017F COLORECTAL CA SCREEN DOC REV: CPT | Performed by: FAMILY MEDICINE

## 2021-12-15 PROCEDURE — 73130 X-RAY EXAM OF HAND: CPT

## 2021-12-15 PROCEDURE — G8482 FLU IMMUNIZE ORDER/ADMIN: HCPCS | Performed by: FAMILY MEDICINE

## 2021-12-15 PROCEDURE — 99214 OFFICE O/P EST MOD 30 MIN: CPT | Performed by: FAMILY MEDICINE

## 2021-12-15 PROCEDURE — G8420 CALC BMI NORM PARAMETERS: HCPCS | Performed by: FAMILY MEDICINE

## 2021-12-15 PROCEDURE — 1036F TOBACCO NON-USER: CPT | Performed by: FAMILY MEDICINE

## 2021-12-15 ASSESSMENT — ENCOUNTER SYMPTOMS
SHORTNESS OF BREATH: 0
EYE PAIN: 0
ABDOMINAL PAIN: 0
CONSTIPATION: 0
COUGH: 0
DIARRHEA: 0

## 2021-12-15 ASSESSMENT — PATIENT HEALTH QUESTIONNAIRE - PHQ9
SUM OF ALL RESPONSES TO PHQ9 QUESTIONS 1 & 2: 0
2. FEELING DOWN, DEPRESSED OR HOPELESS: 0
SUM OF ALL RESPONSES TO PHQ QUESTIONS 1-9: 0
SUM OF ALL RESPONSES TO PHQ QUESTIONS 1-9: 0
1. LITTLE INTEREST OR PLEASURE IN DOING THINGS: 0
SUM OF ALL RESPONSES TO PHQ QUESTIONS 1-9: 0

## 2021-12-15 NOTE — PROGRESS NOTES
Chief Complaint   Patient presents with    Other     hand xray ? hands become numb at night hard to open up          Assessment/Plan:  Brigitte Gaffney is a 61 y.o. female with who presents to clinic for right hand pain and some numbness and tingling as well as ring finger \"sticking\"    1. Right hand pain  We will follow-up x-ray to rule out any osteoarthritis or joint degeneration  May be secondary to ulnar nerve compression in wrist from overuse  Seems to resolve throughout the day  Has not tried any NSAIDs but recommend this at least before bed to see if this helps with symptoms in the morning  - XR HAND RIGHT (MIN 3 VIEWS); Future    2. Trigger ring finger of right hand  As above  We will follow-up x-ray to rule out any osteoarthritis or joint degeneration  May send to Dr. Caroline Tran for evaluation and possible injection of trigger finger  - XR HAND RIGHT (MIN 3 VIEWS); Future        History of Present Ilness:  Brigitte Gaffnye is a 61 y.o. female who presents to clinic with right hand pain. Onset: 1-1.5 months ago  Progression: worse to stable  Quality: sharp pains in the AM only with some numbness and tingling  Radiation: From middle of hand to ring finger and pinky  Severity: Worse in the morning until she starts moving around and then it dissipates until it completely resolves, until the next morning    Review of Systems   Constitutional: Negative for appetite change, chills, fatigue and fever. HENT: Negative for congestion. Eyes: Negative for pain and visual disturbance. Respiratory: Negative for cough and shortness of breath. Cardiovascular: Negative for chest pain and palpitations. Gastrointestinal: Negative for abdominal pain, constipation and diarrhea. Genitourinary: Negative for difficulty urinating. Musculoskeletal: Negative for arthralgias. Skin: Negative for rash and wound. Neurological: Negative for dizziness, weakness, light-headedness and headaches.    Hematological: Does not bruise/bleed easily. Psychiatric/Behavioral: Negative for behavioral problems. Pertinent Past medical, family, and social histories are reviewed and updated in the chart. Past Medical History:   Diagnosis Date    CAD (coronary artery disease)     Angiogram 2016    Endometriosis     Former smoker     Hyperlipidemia, mixed     OAB (overactive bladder)     Osteopenia     ; Rpt DEXA      Past Surgical History:   Procedure Laterality Date    CATARACT REMOVAL Right 2019     SECTION      DILATION AND CURETTAGE OF UTERUS      PARTIAL HYSTERECTOMY      2/2 postpartum bleeding       Medications:   Med list was reviewed/updated in the chart. Allergies   Allergen Reactions    Codeine     Morphine And Related      vomiting         Physical Examination:  BP (!) 121/59 (Site: Left Upper Arm, Position: Sitting, Cuff Size: Large Adult)   Pulse 80   Temp 92.5 °F (33.6 °C) (Temporal)   Wt 138 lb 12.8 oz (63 kg)   BMI 24.59 kg/m²   Physical Exam  Vitals reviewed. Constitutional:       General: She is not in acute distress. Appearance: Normal appearance. She is not ill-appearing. HENT:      Head: Normocephalic and atraumatic. Right Ear: External ear normal.      Left Ear: External ear normal.      Nose: Nose normal. No congestion. Mouth/Throat:      Mouth: Mucous membranes are moist.      Pharynx: Oropharynx is clear. No oropharyngeal exudate. Eyes:      Extraocular Movements: Extraocular movements intact. Conjunctiva/sclera: Conjunctivae normal.      Pupils: Pupils are equal, round, and reactive to light. Cardiovascular:      Rate and Rhythm: Normal rate and regular rhythm. Pulses: Normal pulses. Heart sounds: Normal heart sounds. Pulmonary:      Effort: Pulmonary effort is normal. No respiratory distress. Breath sounds: Normal breath sounds. No stridor. No wheezing, rhonchi or rales.    Abdominal:      General: Bowel sounds are normal. Palpations: Abdomen is soft. Tenderness: There is no abdominal tenderness. Musculoskeletal:         General: Normal range of motion. Right hand: No swelling, deformity, tenderness or bony tenderness. Normal range of motion. Normal strength. Normal strength of finger abduction, thumb/finger opposition and wrist extension. Normal sensation. Left hand: No swelling, deformity, tenderness or bony tenderness. Normal range of motion. Normal strength. Normal strength of finger abduction, thumb/finger opposition and wrist extension. Normal sensation. Cervical back: Normal range of motion and neck supple. Right lower leg: No edema. Left lower leg: No edema. Skin:     General: Skin is warm. Capillary Refill: Capillary refill takes less than 2 seconds. Findings: No erythema or rash. Neurological:      General: No focal deficit present. Mental Status: She is alert and oriented to person, place, and time. Mental status is at baseline. Motor: No weakness. Coordination: Coordination normal.      Gait: Gait normal.   Psychiatric:         Mood and Affect: Mood normal.         Behavior: Behavior normal.         Thought Content:  Thought content normal.         Judgment: Judgment normal.       Follow Up: PRN    Future Appointments   Date Time Provider Shima Santoyo   12/21/2021  8:30 AM SCHEDULE, [facility] INJ RM 1 [facility] HOD   12/21/2021  9:00 AM [facility] RM 2 [facility] HOD   12/21/2021  9:30 AM NUCLEAR STRESS ROOM [facility] HOD       Ezio Fiore MD  12/15/2021

## 2021-12-21 ENCOUNTER — HOSPITAL ENCOUNTER (OUTPATIENT)
Dept: NON INVASIVE DIAGNOSTICS | Age: 66
Discharge: HOME | End: 2021-12-21

## 2021-12-21 ENCOUNTER — HOSPITAL ENCOUNTER (OUTPATIENT)
Dept: NON INVASIVE DIAGNOSTICS | Age: 66
Discharge: HOME | End: 2021-12-21
Payer: COMMERCIAL

## 2021-12-21 DIAGNOSIS — R06.09 DOE (DYSPNEA ON EXERTION): ICD-10-CM

## 2021-12-21 LAB
LV EF: 82 %
LVEF MODALITY: NORMAL

## 2021-12-21 PROCEDURE — 78452 HT MUSCLE IMAGE SPECT MULT: CPT

## 2021-12-21 PROCEDURE — 2580000003 HC RX 258: Performed by: INTERNAL MEDICINE

## 2021-12-21 PROCEDURE — 3430000000 HC RX DIAGNOSTIC RADIOPHARMACEUTICAL: Performed by: INTERNAL MEDICINE

## 2021-12-21 PROCEDURE — A9502 TC99M TETROFOSMIN: HCPCS | Performed by: INTERNAL MEDICINE

## 2021-12-21 PROCEDURE — 93017 CV STRESS TEST TRACING ONLY: CPT

## 2021-12-21 PROCEDURE — 6360000002 HC RX W HCPCS: Performed by: INTERNAL MEDICINE

## 2021-12-21 RX ORDER — SODIUM CHLORIDE 0.9 % (FLUSH) 0.9 %
10 SYRINGE (ML) INJECTION 2 TIMES DAILY
Status: DISCONTINUED | OUTPATIENT
Start: 2021-12-21 | End: 2021-12-22 | Stop reason: HOSPADM

## 2021-12-21 RX ADMIN — TETROFOSMIN 10 MILLICURIE: 1.38 INJECTION, POWDER, LYOPHILIZED, FOR SOLUTION INTRAVENOUS at 08:20

## 2021-12-21 RX ADMIN — Medication 10 ML: at 09:26

## 2021-12-21 RX ADMIN — Medication 10 ML: at 08:21

## 2021-12-21 RX ADMIN — REGADENOSON 0.4 MG: 0.08 INJECTION, SOLUTION INTRAVENOUS at 09:26

## 2021-12-21 RX ADMIN — TETROFOSMIN 30 MILLICURIE: 1.38 INJECTION, POWDER, LYOPHILIZED, FOR SOLUTION INTRAVENOUS at 09:26

## 2022-01-06 ENCOUNTER — VIRTUAL VISIT (OUTPATIENT)
Dept: PRIMARY CARE CLINIC | Age: 67
End: 2022-01-06
Payer: COMMERCIAL

## 2022-01-06 DIAGNOSIS — R05.9 COUGH: ICD-10-CM

## 2022-01-06 DIAGNOSIS — R09.82 PND (POST-NASAL DRIP): ICD-10-CM

## 2022-01-06 DIAGNOSIS — J01.10 ACUTE NON-RECURRENT FRONTAL SINUSITIS: Primary | ICD-10-CM

## 2022-01-06 PROCEDURE — 1036F TOBACCO NON-USER: CPT | Performed by: FAMILY MEDICINE

## 2022-01-06 PROCEDURE — G8427 DOCREV CUR MEDS BY ELIG CLIN: HCPCS | Performed by: FAMILY MEDICINE

## 2022-01-06 PROCEDURE — 3017F COLORECTAL CA SCREEN DOC REV: CPT | Performed by: FAMILY MEDICINE

## 2022-01-06 PROCEDURE — G8420 CALC BMI NORM PARAMETERS: HCPCS | Performed by: FAMILY MEDICINE

## 2022-01-06 PROCEDURE — 99213 OFFICE O/P EST LOW 20 MIN: CPT | Performed by: FAMILY MEDICINE

## 2022-01-06 PROCEDURE — G8482 FLU IMMUNIZE ORDER/ADMIN: HCPCS | Performed by: FAMILY MEDICINE

## 2022-01-06 RX ORDER — BENZONATATE 200 MG/1
200 CAPSULE ORAL 3 TIMES DAILY PRN
Qty: 30 CAPSULE | Refills: 0 | Status: SHIPPED | OUTPATIENT
Start: 2022-01-06 | End: 2022-01-13

## 2022-01-06 RX ORDER — AZITHROMYCIN 250 MG/1
250 TABLET, FILM COATED ORAL SEE ADMIN INSTRUCTIONS
Qty: 6 TABLET | Refills: 0 | Status: SHIPPED | OUTPATIENT
Start: 2022-01-06 | End: 2022-01-11

## 2022-01-06 SDOH — ECONOMIC STABILITY: FOOD INSECURITY: WITHIN THE PAST 12 MONTHS, YOU WORRIED THAT YOUR FOOD WOULD RUN OUT BEFORE YOU GOT MONEY TO BUY MORE.: NEVER TRUE

## 2022-01-06 SDOH — ECONOMIC STABILITY: FOOD INSECURITY: WITHIN THE PAST 12 MONTHS, THE FOOD YOU BOUGHT JUST DIDN'T LAST AND YOU DIDN'T HAVE MONEY TO GET MORE.: NEVER TRUE

## 2022-01-06 ASSESSMENT — SOCIAL DETERMINANTS OF HEALTH (SDOH): HOW HARD IS IT FOR YOU TO PAY FOR THE VERY BASICS LIKE FOOD, HOUSING, MEDICAL CARE, AND HEATING?: NOT HARD AT ALL

## 2022-01-06 NOTE — PROGRESS NOTES
Nikki Lancaster is a 61 y.o. female evaluated via telephone on 1/6/2022. Consent:  She and/or health care decision maker is aware that that she may receive a bill for this telephone service, depending on her insurance coverage, and has provided verbal consent to proceed: Yes      Documentation:  I communicated with the patient and/or health care decision maker about URI symptoms. Sick contacts at home, every one has URI symptoms but tested neg for COVID. She was tested yesterday and was negative as well. Ears are full, coughing a lot, but dry, sore throat and PND as well as sinus congestion and headache. Has tried theraflu and tylenol without improvement. Details of this discussion including any medical advice provided:   1. Acute non-recurrent frontal sinusitis  Likely a viral URI or sinusitis. Treat with abx   Follow up in 1-2 weeks  RTC precautions discussed  Discussed prognosis and duration of symptoms   - azithromycin (ZITHROMAX) 250 MG tablet; Take 1 tablet by mouth See Admin Instructions for 5 days 500mg on day 1 followed by 250mg on days 2 - 5  Dispense: 6 tablet; Refill: 0  - benzonatate (TESSALON) 200 MG capsule; Take 1 capsule by mouth 3 times daily as needed for Cough  Dispense: 30 capsule; Refill: 0    2. Cough  As above  Tessalon for cough  - azithromycin (ZITHROMAX) 250 MG tablet; Take 1 tablet by mouth See Admin Instructions for 5 days 500mg on day 1 followed by 250mg on days 2 - 5  Dispense: 6 tablet; Refill: 0  - benzonatate (TESSALON) 200 MG capsule; Take 1 capsule by mouth 3 times daily as needed for Cough  Dispense: 30 capsule; Refill: 0    3. PND (post-nasal drip)  As above  May try an antihistamine  - azithromycin (ZITHROMAX) 250 MG tablet; Take 1 tablet by mouth See Admin Instructions for 5 days 500mg on day 1 followed by 250mg on days 2 - 5  Dispense: 6 tablet;  Refill: 0    I affirm this is a Patient Initiated Episode with a Patient who has not had a related appointment within my department in the past 7 days or scheduled within the next 24 hours. Patient identification was verified at the start of the visit: Yes    Total Time: minutes: 11-20 minutes    The visit was conducted pursuant to the emergency declaration under the [address] [place] [address], [address] [place] [address] authority and the [employer] Act. Patient identification was verified, and a caregiver was present when appropriate. The patient was located in a state where the provider was credentialed to provide care.     Note: not billable if this call serves to triage the patient into an appointment for the relevant concern      Lucero Saravia MD

## 2022-04-14 ENCOUNTER — TELEMEDICINE (OUTPATIENT)
Dept: PRIMARY CARE CLINIC | Age: 67
End: 2022-04-14
Payer: MEDICARE

## 2022-04-14 ENCOUNTER — TELEPHONE (OUTPATIENT)
Dept: PRIMARY CARE CLINIC | Age: 67
End: 2022-04-14

## 2022-04-14 DIAGNOSIS — J30.1 SEASONAL ALLERGIC RHINITIS DUE TO POLLEN: Primary | ICD-10-CM

## 2022-04-14 PROCEDURE — G8428 CUR MEDS NOT DOCUMENT: HCPCS | Performed by: FAMILY MEDICINE

## 2022-04-14 PROCEDURE — 99213 OFFICE O/P EST LOW 20 MIN: CPT | Performed by: FAMILY MEDICINE

## 2022-04-14 PROCEDURE — 3017F COLORECTAL CA SCREEN DOC REV: CPT | Performed by: FAMILY MEDICINE

## 2022-04-14 PROCEDURE — 1090F PRES/ABSN URINE INCON ASSESS: CPT | Performed by: FAMILY MEDICINE

## 2022-04-14 PROCEDURE — 4040F PNEUMOC VAC/ADMIN/RCVD: CPT | Performed by: FAMILY MEDICINE

## 2022-04-14 PROCEDURE — G8400 PT W/DXA NO RESULTS DOC: HCPCS | Performed by: FAMILY MEDICINE

## 2022-04-14 PROCEDURE — 1123F ACP DISCUSS/DSCN MKR DOCD: CPT | Performed by: FAMILY MEDICINE

## 2022-04-14 RX ORDER — FLUTICASONE PROPIONATE 50 MCG
1 SPRAY, SUSPENSION (ML) NASAL DAILY
Qty: 16 G | Refills: 3 | Status: SHIPPED | OUTPATIENT
Start: 2022-04-14 | End: 2022-11-03

## 2022-04-14 RX ORDER — AZELASTINE 1 MG/ML
2 SPRAY, METERED NASAL 2 TIMES DAILY
Qty: 30 ML | Refills: 3 | Status: SHIPPED | OUTPATIENT
Start: 2022-04-14 | End: 2022-11-03

## 2022-04-14 ASSESSMENT — PATIENT HEALTH QUESTIONNAIRE - PHQ9
1. LITTLE INTEREST OR PLEASURE IN DOING THINGS: 0
2. FEELING DOWN, DEPRESSED OR HOPELESS: 0
SUM OF ALL RESPONSES TO PHQ QUESTIONS 1-9: 0
SUM OF ALL RESPONSES TO PHQ9 QUESTIONS 1 & 2: 0
SUM OF ALL RESPONSES TO PHQ QUESTIONS 1-9: 0

## 2022-04-14 NOTE — PROGRESS NOTES
2022    TELEHEALTH EVALUATION -- Audio/Visual (During CKKNS-66 public health emergency)    HPI:    Terri Aguilar (:  1955) has requested an audio/video evaluation for the following concern(s):    Several day history of sneezing and runny nose associated with scratchy throat and ear pressure. She has tried loratadine over-the-counter without much benefit. She notes no cough or shortness of breath or fever. Review of Systems    Prior to Visit Medications    Medication Sig Taking?  Authorizing Provider   azelastine (ASTELIN) 0.1 % nasal spray 2 sprays by Nasal route 2 times daily Use in each nostril as directed Yes Isaac Gil MD   fluticasone [facility]) 50 MCG/ACT nasal spray 1 spray by Nasal route daily Yes Isaac Gil MD   atorvastatin (LIPITOR) 40 MG tablet Take 1 tablet by mouth daily  Kaya Cosme MD   Ascorbic Acid (VITAMIN C PO) Take 1,000 mg by mouth daily  Historical Provider, MD   aspirin 81 MG chewable tablet Take 81 mg by mouth daily  Historical Provider, MD   Calcium Carb-Cholecalciferol 600-800 MG-UNIT TABS Take 1 tablet by mouth daily  Historical Provider, MD   doxycycline monohydrate (ADOXA) 100 MG tablet Take 100 mg by mouth every 12 hours  Historical Provider, MD   Multiple Vitamins-Minerals (MULTIVITAMIN ADULT PO) Take 1 tablet by mouth daily  Historical Provider, MD   Omega-3 Fatty Acids (FISH OIL OMEGA-3) 1000 MG CAPS Take 1 capsule by mouth daily  Historical Provider, MD   cranberry 475 MG CAPS per capsule Take 4,200 mg by mouth daily  Historical Provider, MD   oxybutynin (DITROPAN XL) 15 MG extended release tablet Take 15 mg by mouth every evening  Historical Provider, MD       Social History     Tobacco Use    Smoking status: Former Smoker     Packs/day: 1.00     Years: 40.00     Pack years: 40.00     Types: Cigarettes     Start date: 1973     Quit date: 2013     Years since quittin.8    Smokeless tobacco: Never Used    Tobacco comment: per radiology questionaire completed by pt 21   Substance Use Topics    Alcohol use: Never    Drug use: Never        Allergies   Allergen Reactions    Codeine     Morphine And Related      vomiting     ,   Past Medical History:   Diagnosis Date    CAD (coronary artery disease)     Angiogram 2016    Endometriosis     Former smoker     Hyperlipidemia, mixed     OAB (overactive bladder)     Osteopenia     ; Rpt DEXA    ,   Past Surgical History:   Procedure Laterality Date    CATARACT REMOVAL Right 2019     SECTION      DILATION AND CURETTAGE OF UTERUS      PARTIAL HYSTERECTOMY      2/2 postpartum bleeding   ,   Social History     Tobacco Use    Smoking status: Former Smoker     Packs/day: 1.00     Years: 40.00     Pack years: 40.00     Types: Cigarettes     Start date: 1973     Quit date: 2013     Years since quittin.8    Smokeless tobacco: Never Used    Tobacco comment: per radiology questionaire completed by pt 21   Substance Use Topics    Alcohol use: Never    Drug use: Never       PHYSICAL EXAMINATION:  There were no vitals taken for this visit.   Wt Readings from Last 3 Encounters:   12/15/21 138 lb 12.8 oz (63 kg)   21 137 lb (62.1 kg)   10/28/20 132 lb 12.8 oz (60.2 kg)       [ INSTRUCTIONS:  \"[x]\" Indicates a positive item  \"[]\" Indicates a negative item  -- DELETE ALL ITEMS NOT EXAMINED]  [x] Alert  [x] Oriented to person/place/time    [x] No apparent distress  []  Appears Unwell:     [x] Breathing appears normal  [] Appears tachypneic      [] Rash on visible skin:    [] Cranial Nerves II-XII grossly intact    [] Motor grossly intact in visible upper extremities    [] Motor grossly intact in visible lower extremities    [] Normal Mood  [] Anxious appearing    [] Depressed appearing     [] OTHER:      Due to this being a TeleHealth encounter, evaluation of the following organ systems is limited: Vitals/Constitutional/EENT/Resp/CV/GI//MS/Neuro/Skin/Heme-Lymph-Imm. Lab Results   Component Value Date     09/16/2021    K 4.1 09/16/2021     09/16/2021    CO2 23 09/16/2021    BUN 14 09/16/2021    CREATININE 0.7 09/16/2021    GLUCOSE 92 09/16/2021    CALCIUM 9.7 09/16/2021    LABALBU 4.2 09/16/2021    LABGLOM >60 09/16/2021    GFRAA >60 09/16/2021     No results found for: LABA1C  No results found for: EAG      ASSESSMENT/PLAN:  1. Seasonal allergic rhinitis due to pollen  Failing over-the-counter therapy. We will try Astelin and Flonase and have patient follow-up in 1 week if she is not improving as acute bronchitis or pharyngitis are also in the differential diagnosis. - azelastine (ASTELIN) 0.1 % nasal spray; 2 sprays by Nasal route 2 times daily Use in each nostril as directed  Dispense: 30 mL; Refill: 3  - fluticasone (FLONASE) 50 MCG/ACT nasal spray; 1 spray by Nasal route daily  Dispense: 16 g; Refill: 3      No follow-ups on file. An  electronic signature was used to authenticate this note.    --Christian Yi MD on 4/14/2022 at 12:04 PM        Pursuant to the emergency declaration under the [facility] waiver authority and the [employer] Act, this Virtual  Visit was conducted, with patient's consent, to reduce the patient's risk of exposure to COVID-19 and provide continuity of care for an established patient. Services were provided through a video synchronous discussion virtually to substitute for in-person clinic visit.

## 2022-04-14 NOTE — TELEPHONE ENCOUNTER
Pt feels like she is getting a ear infection and has a scratchy throat and wants to be prescribed something for it.  Wanted to set up a VV but the earliest availability was the 19th and she said that was too long to wait    Jagdish Kothari  [phone]

## 2022-04-14 NOTE — TELEPHONE ENCOUNTER
She can schedule a VV with another provider that has an available slot or she can fill out an E-vist on [employer].      Dr. Lana Khoury

## 2022-04-21 ENCOUNTER — PATIENT MESSAGE (OUTPATIENT)
Dept: PRIMARY CARE CLINIC | Age: 67
End: 2022-04-21

## 2022-04-21 SDOH — HEALTH STABILITY: PHYSICAL HEALTH: ON AVERAGE, HOW MANY DAYS PER WEEK DO YOU ENGAGE IN MODERATE TO STRENUOUS EXERCISE (LIKE A BRISK WALK)?: 3 DAYS

## 2022-04-21 SDOH — HEALTH STABILITY: PHYSICAL HEALTH: ON AVERAGE, HOW MANY MINUTES DO YOU ENGAGE IN EXERCISE AT THIS LEVEL?: 60 MIN

## 2022-04-21 ASSESSMENT — LIFESTYLE VARIABLES
HOW OFTEN DO YOU HAVE A DRINK CONTAINING ALCOHOL: NEVER
HOW OFTEN DO YOU HAVE A DRINK CONTAINING ALCOHOL: 1
HOW OFTEN DO YOU HAVE SIX OR MORE DRINKS ON ONE OCCASION: 1

## 2022-04-21 ASSESSMENT — PATIENT HEALTH QUESTIONNAIRE - PHQ9
SUM OF ALL RESPONSES TO PHQ QUESTIONS 1-9: 0
SUM OF ALL RESPONSES TO PHQ QUESTIONS 1-9: 0
1. LITTLE INTEREST OR PLEASURE IN DOING THINGS: 0
SUM OF ALL RESPONSES TO PHQ9 QUESTIONS 1 & 2: 0
SUM OF ALL RESPONSES TO PHQ QUESTIONS 1-9: 0
SUM OF ALL RESPONSES TO PHQ QUESTIONS 1-9: 0
2. FEELING DOWN, DEPRESSED OR HOPELESS: 0

## 2022-05-11 DIAGNOSIS — E78.2 MIXED HYPERLIPIDEMIA: ICD-10-CM

## 2022-05-11 DIAGNOSIS — I25.10 CORONARY ARTERY DISEASE INVOLVING NATIVE CORONARY ARTERY OF NATIVE HEART WITHOUT ANGINA PECTORIS: ICD-10-CM

## 2022-05-11 RX ORDER — ATORVASTATIN CALCIUM 40 MG/1
TABLET, FILM COATED ORAL
Qty: 90 TABLET | Refills: 3 | Status: SHIPPED | OUTPATIENT
Start: 2022-05-11

## 2022-05-11 NOTE — TELEPHONE ENCOUNTER
Medication:   Requested Prescriptions     Pending Prescriptions Disp Refills    atorvastatin (LIPITOR) 40 MG tablet [Pharmacy Med Name: ATORVASTATIN 40 MG TABLET] 30 tablet      Sig: TAKE ONE TABLET BY MOUTH DAILY        Last Filled:  11/5/21    Patient Phone Number: [phone] (home)     Last appt: 4/14/2022 No follow-ups on file. Next appt: Visit date not found    Last OARRS: No flowsheet data found.

## 2022-10-08 SDOH — HEALTH STABILITY: PHYSICAL HEALTH: ON AVERAGE, HOW MANY DAYS PER WEEK DO YOU ENGAGE IN MODERATE TO STRENUOUS EXERCISE (LIKE A BRISK WALK)?: 4 DAYS

## 2022-10-08 SDOH — HEALTH STABILITY: PHYSICAL HEALTH: ON AVERAGE, HOW MANY MINUTES DO YOU ENGAGE IN EXERCISE AT THIS LEVEL?: 30 MIN

## 2022-10-08 ASSESSMENT — LIFESTYLE VARIABLES
HOW OFTEN DO YOU HAVE A DRINK CONTAINING ALCOHOL: NEVER
HOW OFTEN DO YOU HAVE A DRINK CONTAINING ALCOHOL: 1
HOW MANY STANDARD DRINKS CONTAINING ALCOHOL DO YOU HAVE ON A TYPICAL DAY: PATIENT DOES NOT DRINK
HOW OFTEN DO YOU HAVE SIX OR MORE DRINKS ON ONE OCCASION: 1
HOW MANY STANDARD DRINKS CONTAINING ALCOHOL DO YOU HAVE ON A TYPICAL DAY: 0

## 2022-10-08 ASSESSMENT — PATIENT HEALTH QUESTIONNAIRE - PHQ9
SUM OF ALL RESPONSES TO PHQ9 QUESTIONS 1 & 2: 0
2. FEELING DOWN, DEPRESSED OR HOPELESS: 0
SUM OF ALL RESPONSES TO PHQ QUESTIONS 1-9: 0
1. LITTLE INTEREST OR PLEASURE IN DOING THINGS: 0
SUM OF ALL RESPONSES TO PHQ QUESTIONS 1-9: 0

## 2022-10-10 ENCOUNTER — TELEPHONE (OUTPATIENT)
Dept: PRIMARY CARE CLINIC | Age: 67
End: 2022-10-10

## 2022-10-10 DIAGNOSIS — E78.2 MIXED HYPERLIPIDEMIA: ICD-10-CM

## 2022-10-10 DIAGNOSIS — M85.80 OSTEOPENIA, UNSPECIFIED LOCATION: ICD-10-CM

## 2022-10-10 DIAGNOSIS — J43.2 CENTRILOBULAR EMPHYSEMA (HCC): ICD-10-CM

## 2022-10-10 DIAGNOSIS — Z00.00 HEALTHCARE MAINTENANCE: ICD-10-CM

## 2022-10-10 DIAGNOSIS — I25.10 CORONARY ARTERY DISEASE INVOLVING NATIVE CORONARY ARTERY OF NATIVE HEART WITHOUT ANGINA PECTORIS: Primary | ICD-10-CM

## 2022-10-10 NOTE — TELEPHONE ENCOUNTER
----- Message from Elinor Sexton sent at 10/7/2022  9:41 AM EDT -----  Subject: Referral Request    Reason for referral request? Pt would like all labs done at 10/14 aw   visit  Provider patient wants to be referred to(if known):     Provider Phone Number(if known):     Additional Information for Provider?   ---------------------------------------------------------------------------  --------------  [facility]    [phone]; OK to leave message on voicemail  ---------------------------------------------------------------------------  --------------

## 2022-10-14 ENCOUNTER — OFFICE VISIT (OUTPATIENT)
Dept: PRIMARY CARE CLINIC | Age: 67
End: 2022-10-14
Payer: MEDICARE

## 2022-10-14 VITALS
TEMPERATURE: 97.1 F | DIASTOLIC BLOOD PRESSURE: 81 MMHG | HEART RATE: 65 BPM | OXYGEN SATURATION: 99 % | WEIGHT: 138 LBS | HEIGHT: 63 IN | SYSTOLIC BLOOD PRESSURE: 137 MMHG | BODY MASS INDEX: 24.45 KG/M2

## 2022-10-14 DIAGNOSIS — Z00.00 WELCOME TO MEDICARE PREVENTIVE VISIT: ICD-10-CM

## 2022-10-14 DIAGNOSIS — M85.80 OSTEOPENIA, UNSPECIFIED LOCATION: ICD-10-CM

## 2022-10-14 DIAGNOSIS — Z00.00 HEALTHCARE MAINTENANCE: ICD-10-CM

## 2022-10-14 DIAGNOSIS — E78.2 MIXED HYPERLIPIDEMIA: ICD-10-CM

## 2022-10-14 DIAGNOSIS — I25.10 CORONARY ARTERY DISEASE INVOLVING NATIVE CORONARY ARTERY OF NATIVE HEART WITHOUT ANGINA PECTORIS: ICD-10-CM

## 2022-10-14 DIAGNOSIS — Z00.00 INITIAL MEDICARE ANNUAL WELLNESS VISIT: Primary | ICD-10-CM

## 2022-10-14 DIAGNOSIS — J43.2 CENTRILOBULAR EMPHYSEMA (HCC): ICD-10-CM

## 2022-10-14 LAB
A/G RATIO: 1.7 (ref 1.1–2.2)
ALBUMIN SERPL-MCNC: 4.5 G/DL (ref 3.4–5)
ALP BLD-CCNC: 81 U/L (ref 40–129)
ALT SERPL-CCNC: 22 U/L (ref 10–40)
ANION GAP SERPL CALCULATED.3IONS-SCNC: 11 MMOL/L (ref 3–16)
AST SERPL-CCNC: 27 U/L (ref 15–37)
BASOPHILS ABSOLUTE: 0 K/UL (ref 0–0.2)
BASOPHILS RELATIVE PERCENT: 0.8 %
BILIRUB SERPL-MCNC: 0.5 MG/DL (ref 0–1)
BUN BLDV-MCNC: 12 MG/DL (ref 7–20)
CALCIUM SERPL-MCNC: 10 MG/DL (ref 8.3–10.6)
CHLORIDE BLD-SCNC: 103 MMOL/L (ref 99–110)
CHOLESTEROL, TOTAL: 159 MG/DL (ref 0–199)
CO2: 26 MMOL/L (ref 21–32)
CREAT SERPL-MCNC: 0.6 MG/DL (ref 0.6–1.2)
EOSINOPHILS ABSOLUTE: 0.1 K/UL (ref 0–0.6)
EOSINOPHILS RELATIVE PERCENT: 2.4 %
GFR AFRICAN AMERICAN: >60
GFR NON-AFRICAN AMERICAN: >60
GLUCOSE BLD-MCNC: 98 MG/DL (ref 70–99)
HCT VFR BLD CALC: 44.2 % (ref 36–48)
HDLC SERPL-MCNC: 45 MG/DL (ref 40–60)
HEMOGLOBIN: 14.4 G/DL (ref 12–16)
HEPATITIS C ANTIBODY INTERPRETATION: NORMAL
LDL CHOLESTEROL CALCULATED: 77 MG/DL
LYMPHOCYTES ABSOLUTE: 1.5 K/UL (ref 1–5.1)
LYMPHOCYTES RELATIVE PERCENT: 26.8 %
MCH RBC QN AUTO: 29.6 PG (ref 26–34)
MCHC RBC AUTO-ENTMCNC: 32.7 G/DL (ref 31–36)
MCV RBC AUTO: 90.6 FL (ref 80–100)
MONOCYTES ABSOLUTE: 0.6 K/UL (ref 0–1.3)
MONOCYTES RELATIVE PERCENT: 10.7 %
NEUTROPHILS ABSOLUTE: 3.4 K/UL (ref 1.7–7.7)
NEUTROPHILS RELATIVE PERCENT: 59.3 %
PDW BLD-RTO: 15.3 % (ref 12.4–15.4)
PLATELET # BLD: 250 K/UL (ref 135–450)
PMV BLD AUTO: 8.7 FL (ref 5–10.5)
POTASSIUM SERPL-SCNC: 4.6 MMOL/L (ref 3.5–5.1)
RBC # BLD: 4.87 M/UL (ref 4–5.2)
SODIUM BLD-SCNC: 140 MMOL/L (ref 136–145)
TOTAL PROTEIN: 7.2 G/DL (ref 6.4–8.2)
TRIGL SERPL-MCNC: 184 MG/DL (ref 0–150)
VITAMIN D 25-HYDROXY: 30.4 NG/ML
VLDLC SERPL CALC-MCNC: 37 MG/DL
WBC # BLD: 5.7 K/UL (ref 4–11)

## 2022-10-14 PROCEDURE — G0402 INITIAL PREVENTIVE EXAM: HCPCS | Performed by: FAMILY MEDICINE

## 2022-10-14 PROCEDURE — 1123F ACP DISCUSS/DSCN MKR DOCD: CPT | Performed by: FAMILY MEDICINE

## 2022-10-14 PROCEDURE — 3017F COLORECTAL CA SCREEN DOC REV: CPT | Performed by: FAMILY MEDICINE

## 2022-10-14 ASSESSMENT — PATIENT HEALTH QUESTIONNAIRE - PHQ9
2. FEELING DOWN, DEPRESSED OR HOPELESS: 0
SUM OF ALL RESPONSES TO PHQ9 QUESTIONS 1 & 2: 0
1. LITTLE INTEREST OR PLEASURE IN DOING THINGS: 0
SUM OF ALL RESPONSES TO PHQ QUESTIONS 1-9: 0

## 2022-10-14 ASSESSMENT — LIFESTYLE VARIABLES
HOW MANY STANDARD DRINKS CONTAINING ALCOHOL DO YOU HAVE ON A TYPICAL DAY: PATIENT DOES NOT DRINK
HOW OFTEN DO YOU HAVE A DRINK CONTAINING ALCOHOL: NEVER

## 2022-10-14 NOTE — PATIENT INSTRUCTIONS
Personalized Preventive Plan for Dayanara Latham - 10/14/2022  Medicare offers a range of preventive health benefits. Some of the tests and screenings are paid in full while other may be subject to a deductible, co-insurance, and/or copay. Some of these benefits include a comprehensive review of your medical history including lifestyle, illnesses that may run in your family, and various assessments and screenings as appropriate. After reviewing your medical record and screening and assessments performed today your provider may have ordered immunizations, labs, imaging, and/or referrals for you. A list of these orders (if applicable) as well as your Preventive Care list are included within your After Visit Summary for your review. Other Preventive Recommendations:    A preventive eye exam performed by an eye specialist is recommended every 1-2 years to screen for glaucoma; cataracts, macular degeneration, and other eye disorders. A preventive dental visit is recommended every 6 months. Try to get at least 150 minutes of exercise per week or 10,000 steps per day on a pedometer . Order or download the FREE \"Exercise & Physical Activity: Your Everyday Guide\" from [employer]. Call 5-200.863.7844 or search [employer] online. You need 3663-8291 mg of calcium and 9322-8516 IU of vitamin D per day. It is possible to meet your calcium requirement with diet alone, but a vitamin D supplement is usually necessary to meet this goal.  When exposed to the sun, use a sunscreen that protects against both UVA and UVB radiation with an SPF of 30 or greater. Reapply every 2 to 3 hours or after sweating, drying off with a towel, or swimming. Always wear a seat belt when traveling in a car. Always wear a helmet when riding a bicycle or motorcycle. Personalized Preventive Plan for Dayanara Latham - 10/14/2022  Medicare offers a range of preventive health benefits.  Some of the tests and screenings are paid in full while other may be subject to a deductible, co-insurance, and/or copay. Some of these benefits include a comprehensive review of your medical history including lifestyle, illnesses that may run in your family, and various assessments and screenings as appropriate. After reviewing your medical record and screening and assessments performed today your provider may have ordered immunizations, labs, imaging, and/or referrals for you. A list of these orders (if applicable) as well as your Preventive Care list are included within your After Visit Summary for your review. Other Preventive Recommendations:    A preventive eye exam performed by an eye specialist is recommended every 1-2 years to screen for glaucoma; cataracts, macular degeneration, and other eye disorders. A preventive dental visit is recommended every 6 months. Try to get at least 150 minutes of exercise per week or 10,000 steps per day on a pedometer . Order or download the FREE \"Exercise & Physical Activity: Your Everyday Guide\" from [employer]. Call 4-442.213.7263 or search [employer] online. You need 8167-8449 mg of calcium and 2701-9323 IU of vitamin D per day. It is possible to meet your calcium requirement with diet alone, but a vitamin D supplement is usually necessary to meet this goal.  When exposed to the sun, use a sunscreen that protects against both UVA and UVB radiation with an SPF of 30 or greater. Reapply every 2 to 3 hours or after sweating, drying off with a towel, or swimming. Always wear a seat belt when traveling in a car. Always wear a helmet when riding a bicycle or motorcycle.

## 2022-10-14 NOTE — PROGRESS NOTES
Medicare Annual Wellness Visit    Carmelo Cornell is here for Medicare AWV    Assessment & Plan   Initial Medicare annual wellness visit    Recommendations for Preventive Services Due: see orders and patient instructions/AVS.  Recommended screening schedule for the next 5-10 years is provided to the patient in written form: see Patient Instructions/AVS.     No follow-ups on file. Subjective       Patient's complete Health Risk Assessment and screening values have been reviewed and are found in Flowsheets. The following problems were reviewed today and where indicated follow up appointments were made and/or referrals ordered. Positive Risk Factor Screenings with Interventions:             General Health and ACP:  General  In general, how would you say your health is?: Good  In the past 7 days, have you experienced any of the following: New or Increased Pain, New or Increased Fatigue, Loneliness, Social Isolation, Stress or Anger?: No  Do you get the social and emotional support that you need?: Yes  Do you have a Living Will?: Yes    Advance Directives       Power of  Living Will ACP-Advance Directive ACP-Power of     Not on File Not on File Not on File Not on File          General Health Risk Interventions:   Patient Will Provide Living Will at next visit, patient made aware Primary care should always have a copy on file. Objective   Vitals:    10/14/22 0922   BP: 137/81   Pulse: 65   Temp: 97.1 °F (36.2 °C)   SpO2: 99%   Weight: 138 lb (62.6 kg)   Height: 5' 3\" (1.6 m)      Body mass index is 24.45 kg/m². Allergies   Allergen Reactions    Codeine     Morphine And Related      vomiting       Prior to Visit Medications    Medication Sig Taking?  Authorizing Provider   atorvastatin (LIPITOR) 40 MG tablet TAKE ONE TABLET BY MOUTH DAILY Yes Tash Colón MD   azelastine (ASTELIN) 0.1 % nasal spray 2 sprays by Nasal route 2 times daily Use in each nostril as directed Yes Adrienne Guthrie MD   fluticasone [facility]) 50 MCG/ACT nasal spray 1 spray by Nasal route daily Yes Adrienne Guthrie MD   Ascorbic Acid (VITAMIN C PO) Take 1,000 mg by mouth daily Yes Historical Provider, MD   aspirin 81 MG chewable tablet Take 81 mg by mouth daily Yes Historical Provider, MD   Calcium Carb-Cholecalciferol 600-800 MG-UNIT TABS Take 1 tablet by mouth daily Yes Historical Provider, MD   Multiple Vitamins-Minerals (MULTIVITAMIN ADULT PO) Take 1 tablet by mouth daily Yes Historical Provider, MD   Omega-3 Fatty Acids (FISH OIL OMEGA-3) 1000 MG CAPS Take 1 capsule by mouth daily Yes Historical Provider, MD   cranberry 475 MG CAPS per capsule Take 4,200 mg by mouth daily Yes Historical Provider, MD   oxybutynin (DITROPAN XL) 15 MG extended release tablet Take 15 mg by mouth every evening Yes Historical Provider, MD   doxycycline monohydrate (ADOXA) 100 MG tablet Take 100 mg by mouth every 12 hours  Historical Provider, MD Dempsey (Including outside providers/suppliers regularly involved in providing care):   Patient Care Team:  Lisa Best MD as PCP - General (Family Medicine)  Lisa Best MD as PCP - [address] [place] [address] [facility]     Reviewed and updated this visit:  Tobacco  Allergies  Meds  Med Hx  Surg Hx  Soc Hx  Fam Hx            I, Katty Terrazas LPN, 10/23/[birth year]82, performed the documented evaluation under the direct supervision of the attending physician.

## 2022-10-15 LAB
HIV AG/AB: NORMAL
HIV ANTIGEN: NORMAL
HIV-1 ANTIBODY: NORMAL
HIV-2 AB: NORMAL

## 2022-11-03 ENCOUNTER — OFFICE VISIT (OUTPATIENT)
Dept: PRIMARY CARE CLINIC | Age: 67
End: 2022-11-03
Payer: MEDICARE

## 2022-11-03 VITALS
SYSTOLIC BLOOD PRESSURE: 162 MMHG | WEIGHT: 139.2 LBS | HEIGHT: 63 IN | BODY MASS INDEX: 24.66 KG/M2 | DIASTOLIC BLOOD PRESSURE: 86 MMHG

## 2022-11-03 DIAGNOSIS — R10.819 LOWER ABDOMINAL TENDERNESS: ICD-10-CM

## 2022-11-03 DIAGNOSIS — E61.1 IRON DEFICIENCY: ICD-10-CM

## 2022-11-03 DIAGNOSIS — M25.512 ACUTE PAIN OF BOTH SHOULDERS: ICD-10-CM

## 2022-11-03 DIAGNOSIS — Z12.31 BREAST CANCER SCREENING BY MAMMOGRAM: ICD-10-CM

## 2022-11-03 DIAGNOSIS — R10.9 BILATERAL FLANK PAIN: Primary | ICD-10-CM

## 2022-11-03 DIAGNOSIS — M54.41 ACUTE BILATERAL LOW BACK PAIN WITH BILATERAL SCIATICA: ICD-10-CM

## 2022-11-03 DIAGNOSIS — M25.511 ACUTE PAIN OF BOTH SHOULDERS: ICD-10-CM

## 2022-11-03 DIAGNOSIS — R10.30 LOWER ABDOMINAL PAIN: ICD-10-CM

## 2022-11-03 DIAGNOSIS — M54.42 ACUTE BILATERAL LOW BACK PAIN WITH BILATERAL SCIATICA: ICD-10-CM

## 2022-11-03 LAB
A/G RATIO: 1.7 (ref 1.1–2.2)
ALBUMIN SERPL-MCNC: 4.6 G/DL (ref 3.4–5)
ALP BLD-CCNC: 92 U/L (ref 40–129)
ALT SERPL-CCNC: 19 U/L (ref 10–40)
ANION GAP SERPL CALCULATED.3IONS-SCNC: 12 MMOL/L (ref 3–16)
AST SERPL-CCNC: 27 U/L (ref 15–37)
BASOPHILS ABSOLUTE: 0.1 K/UL (ref 0–0.2)
BASOPHILS RELATIVE PERCENT: 0.7 %
BILIRUB SERPL-MCNC: 0.7 MG/DL (ref 0–1)
BUN BLDV-MCNC: 10 MG/DL (ref 7–20)
CALCIUM SERPL-MCNC: 10.3 MG/DL (ref 8.3–10.6)
CHLORIDE BLD-SCNC: 100 MMOL/L (ref 99–110)
CO2: 23 MMOL/L (ref 21–32)
CREAT SERPL-MCNC: <0.5 MG/DL (ref 0.6–1.2)
EOSINOPHILS ABSOLUTE: 0.2 K/UL (ref 0–0.6)
EOSINOPHILS RELATIVE PERCENT: 2 %
FERRITIN: 72 NG/ML (ref 15–150)
GFR SERPL CREATININE-BSD FRML MDRD: >60 ML/MIN/{1.73_M2}
GLUCOSE BLD-MCNC: 86 MG/DL (ref 70–99)
HCT VFR BLD CALC: 41.5 % (ref 36–48)
HEMOGLOBIN: 13.8 G/DL (ref 12–16)
IRON SATURATION: 22 % (ref 15–50)
IRON: 74 UG/DL (ref 37–145)
LYMPHOCYTES ABSOLUTE: 1.8 K/UL (ref 1–5.1)
LYMPHOCYTES RELATIVE PERCENT: 23.3 %
MCH RBC QN AUTO: 30.3 PG (ref 26–34)
MCHC RBC AUTO-ENTMCNC: 33.3 G/DL (ref 31–36)
MCV RBC AUTO: 90.9 FL (ref 80–100)
MONOCYTES ABSOLUTE: 0.8 K/UL (ref 0–1.3)
MONOCYTES RELATIVE PERCENT: 10.7 %
NEUTROPHILS ABSOLUTE: 5 K/UL (ref 1.7–7.7)
NEUTROPHILS RELATIVE PERCENT: 63.3 %
PDW BLD-RTO: 14.9 % (ref 12.4–15.4)
PLATELET # BLD: 234 K/UL (ref 135–450)
PMV BLD AUTO: 9 FL (ref 5–10.5)
POTASSIUM SERPL-SCNC: 4.3 MMOL/L (ref 3.5–5.1)
RBC # BLD: 4.57 M/UL (ref 4–5.2)
REASON FOR REJECTION: NORMAL
REJECTED TEST: NORMAL
RHEUMATOID FACTOR: 16 IU/ML
SODIUM BLD-SCNC: 135 MMOL/L (ref 136–145)
TOTAL IRON BINDING CAPACITY: 331 UG/DL (ref 260–445)
TOTAL PROTEIN: 7.3 G/DL (ref 6.4–8.2)
TRANSFERRIN: 291 MG/DL (ref 200–360)
TSH REFLEX: 3.88 UIU/ML (ref 0.27–4.2)
WBC # BLD: 7.9 K/UL (ref 4–11)

## 2022-11-03 PROCEDURE — G8400 PT W/DXA NO RESULTS DOC: HCPCS | Performed by: FAMILY MEDICINE

## 2022-11-03 PROCEDURE — 1123F ACP DISCUSS/DSCN MKR DOCD: CPT | Performed by: FAMILY MEDICINE

## 2022-11-03 PROCEDURE — 99214 OFFICE O/P EST MOD 30 MIN: CPT | Performed by: FAMILY MEDICINE

## 2022-11-03 PROCEDURE — 3017F COLORECTAL CA SCREEN DOC REV: CPT | Performed by: FAMILY MEDICINE

## 2022-11-03 PROCEDURE — 1090F PRES/ABSN URINE INCON ASSESS: CPT | Performed by: FAMILY MEDICINE

## 2022-11-03 PROCEDURE — 1036F TOBACCO NON-USER: CPT | Performed by: FAMILY MEDICINE

## 2022-11-03 PROCEDURE — G8427 DOCREV CUR MEDS BY ELIG CLIN: HCPCS | Performed by: FAMILY MEDICINE

## 2022-11-03 PROCEDURE — G8484 FLU IMMUNIZE NO ADMIN: HCPCS | Performed by: FAMILY MEDICINE

## 2022-11-03 PROCEDURE — G8420 CALC BMI NORM PARAMETERS: HCPCS | Performed by: FAMILY MEDICINE

## 2022-11-03 ASSESSMENT — ENCOUNTER SYMPTOMS
DIARRHEA: 0
BACK PAIN: 1
EYE PAIN: 0
ABDOMINAL PAIN: 0
CONSTIPATION: 0
COUGH: 0
SHORTNESS OF BREATH: 0

## 2022-11-03 NOTE — PROGRESS NOTES
Chief Complaint   Patient presents with    Hip Pain       ASSESSMENT/PLAN:  1. Bilateral flank pain  2. Acute bilateral low back pain with bilateral sciatica  Differential includes musculoskeletal pain with sciatic pain sometimes that has resolved for days until it returns without any new activity or injury. Started on right side, then left. Rule out possible infective process, renal pathology and stool burden  - Comprehensive Metabolic Panel; Future  - CBC with Auto Differential; Future  - CT ABDOMEN PELVIS W IV CONTRAST; Future  - Urinalysis with Microscopic    3. Lower abdominal pain  Differential includes ovarian pathology vs UTI vs GI pathology  Rule out structural abnormality with image and follow up labs. - Comprehensive Metabolic Panel; Future  - TSH with Reflex; Future  - CT ABDOMEN PELVIS W IV CONTRAST; Future  - Urinalysis with Microscopic    4. Lower abdominal tenderness  As above  Very tender on exam, she jumped from table  - Comprehensive Metabolic Panel; Future  - TSH with Reflex; Future  - CT ABDOMEN PELVIS W IV CONTRAST; Future  - Urinalysis with Microscopic    5. Acute pain of both shoulders  Likely musculoskeletal pain from getting up with more use of arms while lower back and legs were hurting  Follow up labs  - RHEUMATOID FACTOR; Future  - CYCLIC CITRUL PEPTIDE ANTIBODY, IGG; Future  - LUPUS (LE) PANEL W/ REFLEX; Future  - TSH with Reflex; Future    6. Iron deficiency  She was told by blood back that she was deficient in iron so she started supplementing. Update and treat accordingly  - CBC with Auto Differential; Future  - Ferritin; Future  - Iron and TIBC; Future  - Transferrin; Future    7. Breast cancer screening by mammogram  Ordered and scheduled. - RUY DIGITAL SCREEN W OR WO CAD BILATERAL; Future         HPI:  Court Jc is a 74 y.o. (: 1955) here today for multiple body aches and abdominal pain that is progressively worsening. Started 3 weeks ago.      First muscle ache was right flank/lower back/buttock (area she puts her hand over). Started out of the blue. She did remember doing a lot of outside work and taking down decor outside. She was climbing up and down a ladder. She attributed the pain to a muscle ache or spasm, took it easy and took some ibuprofen. It dissipated over the course of 3 days. A couple days later the same type of discomfort happened on the left side. She took some ibuprofen, did some stretching and took it easy and it eventually dissipated. The only new thing with this side was that she felt some tingling in her foot and pain did radiate down the side of her leg. During this time, she felt some lower abdominal pain as well. It was hard for her to located at first until it became a little bit more intense. Bilateral lower quadrants. No constipation or diarrhea. No nausea or upset stomach. Appetite remained normal.  Intermittent pain. Cramp-like or sharp. No abnormal vaginal bleeding or blood in stool or urine. Status post hysterectomy but does have her ovaries. Pain in shoulders over the course of 2 weeks, intermittent, waxing and waning. Full range of motion intact but when she abducts arms and starts making circles, the initial backward movement stimulates pain. Tenderness to palpation is difficult to find. There is no radiation of pain down arm. No injury or fall or new exercise or movement. Thought may have been that she was using her arms to get up and down from seated position while her lower back, buttock was in pain. Would like to update mammogram    Donates blood often at [facility]. Was told that she was iron deficient and turned away. Started taking iron twice a day but became constipated. Stopped this 2 months ago. Review of Systems   Constitutional:  Negative for appetite change, chills, fatigue and fever. HENT:  Negative for congestion. Eyes:  Negative for pain and visual disturbance.    Respiratory: Negative for cough and shortness of breath. Cardiovascular:  Negative for chest pain and palpitations. Gastrointestinal:  Negative for abdominal pain, constipation and diarrhea. Genitourinary:  Negative for difficulty urinating. Musculoskeletal:  Positive for arthralgias, back pain and myalgias. Skin:  Negative for rash and wound. Neurological:  Negative for dizziness, weakness, light-headedness and headaches. Hematological:  Does not bruise/bleed easily. Psychiatric/Behavioral:  Negative for behavioral problems.       Past Medical History:   Diagnosis Date    CAD (coronary artery disease)     Angiogram 2016    Endometriosis     Former smoker     Hyperlipidemia, mixed     OAB (overactive bladder)     Osteopenia     ; Rpt DEXA        Family History   Problem Relation Age of Onset    Lung Cancer Father 45    Atrial Fibrillation Brother        Social History     Tobacco Use    Smoking status: Former     Packs/day: 1.00     Years: 40.00     Pack years: 40.00     Types: Cigarettes     Start date: 1973     Quit date: 2013     Years since quittin.4    Smokeless tobacco: Never    Tobacco comments:     per radiology questionaire completed by pt 21   Vaping Use    Vaping Use: Never used   Substance Use Topics    Alcohol use: Never    Drug use: Never       New Prescriptions    No medications on file       Meds Prior to visit:  Current Outpatient Medications on File Prior to Visit   Medication Sig Dispense Refill    atorvastatin (LIPITOR) 40 MG tablet TAKE ONE TABLET BY MOUTH DAILY 90 tablet 3    Ascorbic Acid (VITAMIN C PO) Take 1,000 mg by mouth daily      aspirin 81 MG chewable tablet Take 81 mg by mouth daily      Calcium Carb-Cholecalciferol 600-800 MG-UNIT TABS Take 1 tablet by mouth daily      Multiple Vitamins-Minerals (MULTIVITAMIN ADULT PO) Take 1 tablet by mouth daily      Omega-3 Fatty Acids (FISH OIL OMEGA-3) 1000 MG CAPS Take 1 capsule by mouth daily      cranberry 475 MG CAPS per capsule Take 4,200 mg by mouth daily      oxybutynin (DITROPAN XL) 15 MG extended release tablet Take 15 mg by mouth every evening      fluticasone (FLONASE) 50 MCG/ACT nasal spray 1 spray by Nasal route daily (Patient not taking: Reported on 11/3/2022) 16 g 3     No current facility-administered medications on file prior to visit. Allergies   Allergen Reactions    Codeine     Morphine And Related      vomiting         OBJECTIVE:  BP (!) 162/86   Ht 5' 3\" (1.6 m)   Wt 139 lb 3.2 oz (63.1 kg)   BMI 24.66 kg/m²   BP Readings from Last 2 Encounters:   11/03/22 (!) 162/86   10/14/22 137/81     Wt Readings from Last 3 Encounters:   11/03/22 139 lb 3.2 oz (63.1 kg)   10/14/22 138 lb (62.6 kg)   12/15/21 138 lb 12.8 oz (63 kg)       Physical Exam  Vitals reviewed. Constitutional:       General: She is not in acute distress. Appearance: Normal appearance. She is normal weight. She is not ill-appearing. HENT:      Head: Normocephalic and atraumatic. Right Ear: External ear normal.      Left Ear: External ear normal.      Nose: Nose normal. No congestion. Mouth/Throat:      Mouth: Mucous membranes are moist.      Pharynx: Oropharynx is clear. No oropharyngeal exudate. Eyes:      Extraocular Movements: Extraocular movements intact. Conjunctiva/sclera: Conjunctivae normal.      Pupils: Pupils are equal, round, and reactive to light. Cardiovascular:      Rate and Rhythm: Normal rate and regular rhythm. Pulses: Normal pulses. Heart sounds: Normal heart sounds. Pulmonary:      Effort: Pulmonary effort is normal. No respiratory distress. Breath sounds: Normal breath sounds. No stridor. No wheezing, rhonchi or rales. Abdominal:      General: Bowel sounds are normal. There is no distension. Palpations: Abdomen is soft. Tenderness: There is abdominal tenderness in the right lower quadrant and left lower quadrant.  Negative signs include Hanley's sign, McBurney's sign and obturator sign. Hernia: No hernia is present. Musculoskeletal:         General: Normal range of motion. Cervical back: Normal range of motion and neck supple. Right lower leg: No edema. Left lower leg: No edema. Comments: Back/Hip Exam  Inspection:    -Leg length: Equal   -Spine: No abnormal curvature  Palpation:    -Midline: Nontender   -Paraspinal: Nontender   -SI Joints: Nontender   -Buttocks: Nontender   -Greater Trochanters: Nontender   -ASIS: Nontender  ROM:    -Hip Flexion: Normal   -Back Flexion: Normal   -Back Extension: Normal   -Twisting: Normal   -Lateral bending: Normal  Right Hip Internal Rotation: Painful  Left Hip Internal Rotation: Normal  LE strength: bilateral LE strength normal and equal bilaterally   LE reflexes: patellar reflexes normal and equal bilaterally   Right FADIR: Painful over buttock area, felt a tight pull and sharp pain  Left FADIR: Normal  Right JOVANNA: Normal  Left JOVANNA: Normal  Right supine straight leg raise: Painful at 90 degrees and tightness in hamstring felt  Left supine straight leg raise: Normal   Skin:     General: Skin is warm. Capillary Refill: Capillary refill takes less than 2 seconds. Findings: No erythema or rash. Neurological:      General: No focal deficit present. Mental Status: She is alert and oriented to person, place, and time. Mental status is at baseline. Motor: No weakness. Coordination: Coordination normal.      Gait: Gait normal.      Deep Tendon Reflexes: Reflexes normal.   Psychiatric:         Mood and Affect: Mood normal.         Behavior: Behavior normal.         Thought Content:  Thought content normal.         Judgment: Judgment normal.       Lab Results   Component Value Date    WBC 5.7 10/14/2022    HGB 14.4 10/14/2022    HCT 44.2 10/14/2022    MCV 90.6 10/14/2022     10/14/2022     Lab Results   Component Value Date     10/14/2022    K 4.6 10/14/2022     10/14/2022    CO2 26 10/14/2022    BUN 12 10/14/2022    CREATININE 0.6 10/14/2022    GLUCOSE 98 10/14/2022    CALCIUM 10.0 10/14/2022    PROT 7.2 10/14/2022    LABALBU 4.5 10/14/2022    BILITOT 0.5 10/14/2022    ALKPHOS 81 10/14/2022    AST 27 10/14/2022    ALT 22 10/14/2022    LABGLOM >60 10/14/2022    GFRAA >60 10/14/2022    AGRATIO 1.7 10/14/2022     Lab Results   Component Value Date    CHOL 159 10/14/2022     Lab Results   Component Value Date    TRIG 184 (H) 10/14/2022     Lab Results   Component Value Date    HDL 45 10/14/2022     Lab Results   Component Value Date    LDLCALC 77 10/14/2022     Lab Results   Component Value Date    LABVLDL 37 10/14/2022     No results found for: LABA1C    Discussed use, benefit, and side effects of prescribed medications. Barriers to medication compliance addressed. All patient questions answered. Pt voiced understanding. RTC No follow-ups on file.     Future Appointments   Date Time Provider Shima Leoni   11/15/2022 11:00 AM [facility] CT RM 1 [facility]   12/13/2022 10:45 AM MAMMOGRAPHY [facility] ROOM 2 [facility]   10/24/2023  9:30 AM SCHEDULE, SARI Botello MD  11/3/2022  1:06 PM

## 2022-11-04 ENCOUNTER — TELEPHONE (OUTPATIENT)
Dept: PRIMARY CARE CLINIC | Age: 67
End: 2022-11-04

## 2022-11-04 DIAGNOSIS — M25.511 ACUTE PAIN OF BOTH SHOULDERS: Primary | ICD-10-CM

## 2022-11-04 DIAGNOSIS — M25.512 ACUTE PAIN OF BOTH SHOULDERS: Primary | ICD-10-CM

## 2022-11-04 LAB
BACTERIA: ABNORMAL /HPF
BILIRUBIN URINE: NEGATIVE
BLOOD, URINE: NEGATIVE
CALCIUM OXALATE CRYSTALS: PRESENT
CLARITY: CLEAR
COLOR: YELLOW
CYCLIC CITRULLINATED PEPTIDE ANTIBODY IGG: 1.5 U/ML (ref 0–2.9)
EPITHELIAL CELLS, UA: 1 /HPF (ref 0–5)
GLUCOSE URINE: NEGATIVE MG/DL
HYALINE CASTS: 2 /LPF (ref 0–8)
KETONES, URINE: ABNORMAL MG/DL
LEUKOCYTE ESTERASE, URINE: ABNORMAL
MICROSCOPIC EXAMINATION: YES
NITRITE, URINE: NEGATIVE
PH UA: 5.5 (ref 5–8)
PROTEIN UA: ABNORMAL MG/DL
RBC UA: 1 /HPF (ref 0–4)
SPECIFIC GRAVITY UA: 1.03 (ref 1–1.03)
URINE TYPE: ABNORMAL
UROBILINOGEN, URINE: 0.2 E.U./DL
WBC UA: 21 /HPF (ref 0–5)

## 2022-11-04 NOTE — TELEPHONE ENCOUNTER
I have placed the order. The lab did not draw enough blood to proceed with the autoimmune panel. Please let patient know in case she would like to have this test run again. If she isnt interested, that is ok too. It is to evaluate the shoulder pains and to rule out any types of specific arthritic conditions other than pain from wear and tear over time (whic is osteoarthritis).      Dr. Juanpablo Dodd

## 2022-11-04 NOTE — TELEPHONE ENCOUNTER
----- Message from Jocelin Woodward sent at 11/4/2022  9:17 AM EDT -----  Subject: Message to Provider    QUESTIONS  Information for Provider? Kang Moise from the [address] [facility] called. She was   unable to run the comprehensive lupus specimen. It will have to be sent   again.   ---------------------------------------------------------------------------  --------------  [facility]  [phone]; OK to leave message on voicemail  ---------------------------------------------------------------------------  --------------  SCRIPT ANSWERS  Relationship to Patient? Third Party  Third Party Type? Hospital?   Representative Name?  Kang Moise from [id number] [place], [place] [address]

## 2022-11-04 NOTE — TELEPHONE ENCOUNTER
Pt informed. She is ok with skipping this test for now. Will call back and let us know if she changes her mind.

## 2022-11-09 ENCOUNTER — TELEPHONE (OUTPATIENT)
Dept: CASE MANAGEMENT | Age: 67
End: 2022-11-09

## 2022-11-09 NOTE — TELEPHONE ENCOUNTER
Patient due for annual CT Lung Screening. Reminder letter mailed.       Marquita ZAMORANON, RN   Lung Nodule Navigator  [facility]  [phone]

## 2022-11-15 ENCOUNTER — HOSPITAL ENCOUNTER (OUTPATIENT)
Dept: CT IMAGING | Age: 67
Discharge: HOME | End: 2022-11-15
Payer: MEDICARE

## 2022-11-15 DIAGNOSIS — R10.819 LOWER ABDOMINAL TENDERNESS: ICD-10-CM

## 2022-11-15 DIAGNOSIS — R10.30 LOWER ABDOMINAL PAIN: ICD-10-CM

## 2022-11-15 DIAGNOSIS — R10.9 BILATERAL FLANK PAIN: ICD-10-CM

## 2022-11-15 PROCEDURE — 74177 CT ABD & PELVIS W/CONTRAST: CPT

## 2022-11-15 PROCEDURE — 6360000004 HC RX CONTRAST MEDICATION: Performed by: FAMILY MEDICINE

## 2022-11-15 RX ADMIN — IOPAMIDOL 75 ML: 755 INJECTION, SOLUTION INTRAVENOUS at 07:17

## 2022-12-13 ENCOUNTER — TELEPHONE (OUTPATIENT)
Dept: CASE MANAGEMENT | Age: 67
End: 2022-12-13

## 2022-12-13 ENCOUNTER — HOSPITAL ENCOUNTER (OUTPATIENT)
Dept: MAMMOGRAPHY | Age: 67
Discharge: HOME | End: 2022-12-13
Payer: MEDICARE

## 2022-12-13 VITALS — BODY MASS INDEX: 24.63 KG/M2 | HEIGHT: 63 IN | WEIGHT: 139 LBS

## 2022-12-13 DIAGNOSIS — Z12.31 VISIT FOR SCREENING MAMMOGRAM: ICD-10-CM

## 2022-12-13 DIAGNOSIS — Z12.31 BREAST CANCER SCREENING BY MAMMOGRAM: ICD-10-CM

## 2022-12-13 PROCEDURE — 77067 SCR MAMMO BI INCL CAD: CPT

## 2022-12-13 NOTE — TELEPHONE ENCOUNTER
Patient due for annual CT Lung Screening. Reminder letter mailed.       Marquita ZAMORANON, RN   Lung Nodule Navigator  [facility]  [phone]

## 2023-01-04 ENCOUNTER — OFFICE VISIT (OUTPATIENT)
Dept: PRIMARY CARE CLINIC | Age: 68
End: 2023-01-04
Payer: MEDICARE

## 2023-01-04 VITALS
WEIGHT: 140.8 LBS | HEIGHT: 63 IN | TEMPERATURE: 97 F | HEART RATE: 72 BPM | BODY MASS INDEX: 24.95 KG/M2 | DIASTOLIC BLOOD PRESSURE: 64 MMHG | SYSTOLIC BLOOD PRESSURE: 154 MMHG

## 2023-01-04 DIAGNOSIS — R30.0 DYSURIA: Primary | ICD-10-CM

## 2023-01-04 LAB
BACTERIA: ABNORMAL /HPF
BILIRUBIN URINE: NEGATIVE
BILIRUBIN, POC: NORMAL
BLOOD URINE, POC: NORMAL
BLOOD, URINE: NEGATIVE
CLARITY, POC: CLEAR
CLARITY: CLEAR
COLOR, POC: YELLOW
COLOR: ABNORMAL
EPITHELIAL CELLS, UA: 3 /HPF (ref 0–5)
GLUCOSE URINE, POC: 100
GLUCOSE URINE: NEGATIVE MG/DL
HYALINE CASTS: 0 /LPF (ref 0–8)
KETONES, POC: NORMAL
KETONES, URINE: NEGATIVE MG/DL
LEUKOCYTE EST, POC: NORMAL
LEUKOCYTE ESTERASE, URINE: ABNORMAL
MICROSCOPIC EXAMINATION: YES
NITRITE, POC: POSITIVE
NITRITE, URINE: POSITIVE
PH UA: 5.5 (ref 5–8)
PH, POC: 5.5
PROTEIN UA: NEGATIVE MG/DL
PROTEIN, POC: NEGATIVE
RBC UA: 1 /HPF (ref 0–4)
SPECIFIC GRAVITY UA: 1.02 (ref 1–1.03)
SPECIFIC GRAVITY, POC: 1.02
URINE REFLEX TO CULTURE: YES
URINE TYPE: ABNORMAL
UROBILINOGEN, POC: 0.2
UROBILINOGEN, URINE: 1 E.U./DL
WBC UA: 47 /HPF (ref 0–5)

## 2023-01-04 PROCEDURE — 1036F TOBACCO NON-USER: CPT | Performed by: FAMILY MEDICINE

## 2023-01-04 PROCEDURE — 99214 OFFICE O/P EST MOD 30 MIN: CPT | Performed by: FAMILY MEDICINE

## 2023-01-04 PROCEDURE — G8484 FLU IMMUNIZE NO ADMIN: HCPCS | Performed by: FAMILY MEDICINE

## 2023-01-04 PROCEDURE — G8427 DOCREV CUR MEDS BY ELIG CLIN: HCPCS | Performed by: FAMILY MEDICINE

## 2023-01-04 PROCEDURE — 3017F COLORECTAL CA SCREEN DOC REV: CPT | Performed by: FAMILY MEDICINE

## 2023-01-04 PROCEDURE — 1123F ACP DISCUSS/DSCN MKR DOCD: CPT | Performed by: FAMILY MEDICINE

## 2023-01-04 PROCEDURE — 81002 URINALYSIS NONAUTO W/O SCOPE: CPT | Performed by: FAMILY MEDICINE

## 2023-01-04 PROCEDURE — 81003 URINALYSIS AUTO W/O SCOPE: CPT | Performed by: FAMILY MEDICINE

## 2023-01-04 PROCEDURE — G8420 CALC BMI NORM PARAMETERS: HCPCS | Performed by: FAMILY MEDICINE

## 2023-01-04 PROCEDURE — 1090F PRES/ABSN URINE INCON ASSESS: CPT | Performed by: FAMILY MEDICINE

## 2023-01-04 PROCEDURE — G8400 PT W/DXA NO RESULTS DOC: HCPCS | Performed by: FAMILY MEDICINE

## 2023-01-04 RX ORDER — NITROFURANTOIN 25; 75 MG/1; MG/1
100 CAPSULE ORAL 2 TIMES DAILY
Qty: 14 CAPSULE | Refills: 0 | Status: SHIPPED | OUTPATIENT
Start: 2023-01-04 | End: 2023-01-11

## 2023-01-04 ASSESSMENT — ENCOUNTER SYMPTOMS
CONSTIPATION: 0
COUGH: 0
DIARRHEA: 0
ABDOMINAL PAIN: 0
SHORTNESS OF BREATH: 0
EYE PAIN: 0

## 2023-01-04 ASSESSMENT — PATIENT HEALTH QUESTIONNAIRE - PHQ9
SUM OF ALL RESPONSES TO PHQ QUESTIONS 1-9: 0
SUM OF ALL RESPONSES TO PHQ9 QUESTIONS 1 & 2: 0
SUM OF ALL RESPONSES TO PHQ QUESTIONS 1-9: 0
2. FEELING DOWN, DEPRESSED OR HOPELESS: 0
1. LITTLE INTEREST OR PLEASURE IN DOING THINGS: 0
SUM OF ALL RESPONSES TO PHQ QUESTIONS 1-9: 0
SUM OF ALL RESPONSES TO PHQ QUESTIONS 1-9: 0

## 2023-01-04 NOTE — PROGRESS NOTES
Chief Complaint   Patient presents with    Other     Possible uti, frequent urination for 2 days          ASSESSMENT/PLAN:  1. Dysuria  Treat for UTI with hematuria  Follow up culture and change abx if needed  Follow up in 5 days if symptoms persist.   - POCT Urinalysis no Micro  - nitrofurantoin, macrocrystal-monohydrate, (MACROBID) 100 MG capsule; Take 1 capsule by mouth 2 times daily for 7 days  Dispense: 14 capsule; Refill: 0  - Urinalysis with Reflex to Culture         HPI:  Betsey Lorenzana is a 74 y.o. (: 1955) here today for dysuria for 2 days  Took azo without improvement. Has been staying well hydrated, denies fever, chills, lower abd pain and blood in urine. No lower back or flank pain. Review of Systems   Constitutional:  Negative for appetite change, chills, fatigue and fever. HENT:  Negative for congestion. Eyes:  Negative for pain and visual disturbance. Respiratory:  Negative for cough and shortness of breath. Cardiovascular:  Negative for chest pain and palpitations. Gastrointestinal:  Negative for abdominal pain, constipation and diarrhea. Genitourinary:  Positive for dysuria and urgency. Negative for difficulty urinating, flank pain, frequency and hematuria. Musculoskeletal:  Negative for arthralgias. Skin:  Negative for rash and wound. Neurological:  Negative for dizziness, weakness, light-headedness and headaches. Hematological:  Does not bruise/bleed easily. Psychiatric/Behavioral:  Negative for behavioral problems.       Past Medical History:   Diagnosis Date    CAD (coronary artery disease)     Angiogram 2016    Endometriosis     Former smoker     Hyperlipidemia, mixed     OAB (overactive bladder)     Osteopenia     ; Rpt DEXA        Family History   Problem Relation Age of Onset    Lung Cancer Father 45    Atrial Fibrillation Brother        Social History     Tobacco Use    Smoking status: Former     Packs/day: 1.00     Years: 40.00     Pack years: 40.00 Types: Cigarettes     Start date: 1973     Quit date: 2013     Years since quittin.5    Smokeless tobacco: Never    Tobacco comments:     per radiology questionaire completed by pt 21   Vaping Use    Vaping Use: Never used   Substance Use Topics    Alcohol use: Never    Drug use: Never       New Prescriptions    NITROFURANTOIN, MACROCRYSTAL-MONOHYDRATE, (MACROBID) 100 MG CAPSULE    Take 1 capsule by mouth 2 times daily for 7 days       Meds Prior to visit:  Current Outpatient Medications on File Prior to Visit   Medication Sig Dispense Refill    atorvastatin (LIPITOR) 40 MG tablet TAKE ONE TABLET BY MOUTH DAILY 90 tablet 3    Ascorbic Acid (VITAMIN C PO) Take 1,000 mg by mouth daily      aspirin 81 MG chewable tablet Take 81 mg by mouth daily      Calcium Carb-Cholecalciferol 600-800 MG-UNIT TABS Take 1 tablet by mouth daily      Multiple Vitamins-Minerals (MULTIVITAMIN ADULT PO) Take 1 tablet by mouth daily      Omega-3 Fatty Acids (FISH OIL OMEGA-3) 1000 MG CAPS Take 1 capsule by mouth daily      cranberry 475 MG CAPS per capsule Take 4,200 mg by mouth daily      oxybutynin (DITROPAN XL) 15 MG extended release tablet Take 15 mg by mouth every evening       No current facility-administered medications on file prior to visit. Allergies   Allergen Reactions    Codeine     Morphine And Related      vomiting         OBJECTIVE:  BP (!) 154/64   Pulse 72   Temp 97 °F (36.1 °C) (Temporal)   Ht 5' 3\" (1.6 m)   Wt 140 lb 12.8 oz (63.9 kg)   BMI 24.94 kg/m²   BP Readings from Last 2 Encounters:   23 (!) 154/64   22 (!) 162/86     Wt Readings from Last 3 Encounters:   23 140 lb 12.8 oz (63.9 kg)   22 139 lb (63 kg)   22 139 lb 3.2 oz (63.1 kg)       Physical Exam  Vitals reviewed. Constitutional:       General: She is not in acute distress. Appearance: Normal appearance. She is normal weight. She is not ill-appearing.    HENT:      Head: Normocephalic and atraumatic. Right Ear: External ear normal.      Left Ear: External ear normal.      Nose: Nose normal. No congestion. Mouth/Throat:      Mouth: Mucous membranes are moist.      Pharynx: Oropharynx is clear. No oropharyngeal exudate. Eyes:      Extraocular Movements: Extraocular movements intact. Conjunctiva/sclera: Conjunctivae normal.      Pupils: Pupils are equal, round, and reactive to light. Cardiovascular:      Rate and Rhythm: Normal rate and regular rhythm. Pulses: Normal pulses. Heart sounds: Normal heart sounds. Pulmonary:      Effort: Pulmonary effort is normal. No respiratory distress. Abdominal:      General: There is no distension. Palpations: Abdomen is soft. Musculoskeletal:         General: Normal range of motion. Cervical back: Normal range of motion and neck supple. Right lower leg: No edema. Left lower leg: No edema. Skin:     General: Skin is warm. Capillary Refill: Capillary refill takes less than 2 seconds. Findings: No erythema or rash. Neurological:      General: No focal deficit present. Mental Status: She is alert and oriented to person, place, and time. Mental status is at baseline. Motor: No weakness. Coordination: Coordination normal.      Gait: Gait normal.   Psychiatric:         Mood and Affect: Mood normal.         Behavior: Behavior normal.         Thought Content:  Thought content normal.         Judgment: Judgment normal.       Lab Results   Component Value Date    WBC 7.9 11/03/2022    HGB 13.8 11/03/2022    HCT 41.5 11/03/2022    MCV 90.9 11/03/2022     11/03/2022     Lab Results   Component Value Date     (L) 11/03/2022    K 4.3 11/03/2022     11/03/2022    CO2 23 11/03/2022    BUN 10 11/03/2022    CREATININE <0.5 (L) 11/03/2022    GLUCOSE 86 11/03/2022    CALCIUM 10.3 11/03/2022    PROT 7.3 11/03/2022    LABALBU 4.6 11/03/2022    BILITOT 0.7 11/03/2022    ALKPHOS 92 11/03/2022 AST 27 11/03/2022    ALT 19 11/03/2022    LABGLOM >60 11/03/2022    GFRAA >60 10/14/2022    AGRATIO 1.7 11/03/2022     Lab Results   Component Value Date    CHOL 159 10/14/2022     Lab Results   Component Value Date    TRIG 184 (H) 10/14/2022     Lab Results   Component Value Date    HDL 45 10/14/2022     Lab Results   Component Value Date    LDLCALC 77 10/14/2022     Lab Results   Component Value Date    LABVLDL 37 10/14/2022     No results found for: LABA1C      Discussed use, benefit, and side effects of prescribed medications. Barriers to medication compliance addressed. All patient questions answered. Pt voiced understanding. RTC No follow-ups on file.     Future Appointments   Date Time Provider Shima Santoyo   10/24/2023  9:30 AM SCHEDULE, [facility] LPN Sofia Odom MD  1/4/2023  4:13 PM

## 2023-01-06 LAB
ORGANISM: ABNORMAL
URINE CULTURE, ROUTINE: ABNORMAL

## 2023-02-06 ENCOUNTER — TELEPHONE (OUTPATIENT)
Dept: CASE MANAGEMENT | Age: 68
End: 2023-02-06

## 2023-02-06 NOTE — TELEPHONE ENCOUNTER
Patient due for annual CT Lung Screening. Final reminder letter mailed.       Paul ZAMORANON, RN   Lung Nodule Navigator  [facility]  [phone]

## 2023-02-28 ENCOUNTER — TELEPHONE (OUTPATIENT)
Dept: PRIMARY CARE CLINIC | Age: 68
End: 2023-02-28
Payer: MEDICARE

## 2023-02-28 DIAGNOSIS — Z87.891 PERSONAL HISTORY OF TOBACCO USE: Primary | ICD-10-CM

## 2023-02-28 PROCEDURE — G0296 VISIT TO DETERM LDCT ELIG: HCPCS | Performed by: FAMILY MEDICINE

## 2023-02-28 NOTE — PATIENT INSTRUCTIONS

## 2023-03-22 ENCOUNTER — HOSPITAL ENCOUNTER (OUTPATIENT)
Dept: CT IMAGING | Age: 68
Discharge: HOME | End: 2023-03-22
Payer: MEDICARE

## 2023-03-22 DIAGNOSIS — Z87.891 PERSONAL HISTORY OF TOBACCO USE: ICD-10-CM

## 2023-03-22 PROCEDURE — 71271 CT THORAX LUNG CANCER SCR C-: CPT

## 2023-03-27 ENCOUNTER — TELEPHONE (OUTPATIENT)
Dept: PRIMARY CARE CLINIC | Age: 68
End: 2023-03-27

## 2023-03-27 NOTE — TELEPHONE ENCOUNTER
----- Message from Lucero Andujar sent at 3/27/2023  9:12 AM EDT -----  Subject: Message to Provider    QUESTIONS  Information for Provider? pt calling to see when she should get booster   covid shot last one was done 09/19/22  ---------------------------------------------------------------------------  --------------  [address]  [phone]; OK to leave message on voicemail  ---------------------------------------------------------------------------  --------------  SCRIPT ANSWERS  Relationship to Patient?  Self

## 2023-03-27 NOTE — TELEPHONE ENCOUNTER
She can get a booster if she would like. I usually tell patients to wait about 8-9 months after last shot. It is up to her.      Dr. Waqas Abad

## 2023-04-18 ENCOUNTER — NURSE ONLY (OUTPATIENT)
Dept: PRIMARY CARE CLINIC | Age: 68
End: 2023-04-18
Payer: MEDICARE

## 2023-04-18 DIAGNOSIS — R30.0 DYSURIA: Primary | ICD-10-CM

## 2023-04-18 DIAGNOSIS — N30.01 ACUTE CYSTITIS WITH HEMATURIA: ICD-10-CM

## 2023-04-18 LAB
BILIRUBIN, POC: NEGATIVE
BLOOD URINE, POC: ABNORMAL
CLARITY, POC: ABNORMAL
COLOR, POC: ABNORMAL
GLUCOSE URINE, POC: NEGATIVE
KETONES, POC: NEGATIVE
LEUKOCYTE EST, POC: ABNORMAL
NITRITE, POC: POSITIVE
PH, POC: 5.5
PROTEIN, POC: NEGATIVE
SPECIFIC GRAVITY, POC: 1.01
UROBILINOGEN, POC: 0.2

## 2023-04-18 PROCEDURE — 81002 URINALYSIS NONAUTO W/O SCOPE: CPT | Performed by: FAMILY MEDICINE

## 2023-04-18 RX ORDER — SULFAMETHOXAZOLE AND TRIMETHOPRIM 800; 160 MG/1; MG/1
1 TABLET ORAL 2 TIMES DAILY
Qty: 14 TABLET | Refills: 0 | Status: SHIPPED | OUTPATIENT
Start: 2023-04-18 | End: 2023-04-25

## 2023-04-18 NOTE — PROGRESS NOTES
Please call patient to let her know I sent in antibiotics for her. Thank you!   Dr. Luis Alfredo Elizalde

## 2023-04-18 NOTE — ADDENDUM NOTE
Addended by: Gisele Mock on: 4/18/2023 12:28 PM     Modules accepted: Orders Per AVS from [facility] on 10.5.2022 with PCP:  STOP taking:  L-Theanine 100 MG Cap  Stopped by: Afshin Wakefield MD    See patient message and advise

## 2023-04-18 NOTE — PROGRESS NOTES
Pt came in to leave a urin sample.  Asked to be called with results and to be informed if anything will be sent in

## 2023-04-19 LAB
BACTERIA URNS QL MICRO: ABNORMAL /HPF
BILIRUB UR QL STRIP.AUTO: NEGATIVE
CLARITY UR: ABNORMAL
COLOR UR: ABNORMAL
EPI CELLS #/AREA URNS AUTO: 1 /HPF (ref 0–5)
GLUCOSE UR STRIP.AUTO-MCNC: NEGATIVE MG/DL
HGB UR QL STRIP.AUTO: ABNORMAL
HYALINE CASTS #/AREA URNS AUTO: 0 /LPF (ref 0–8)
KETONES UR STRIP.AUTO-MCNC: NEGATIVE MG/DL
LEUKOCYTE ESTERASE UR QL STRIP.AUTO: ABNORMAL
NITRITE UR QL STRIP.AUTO: POSITIVE
PH UR STRIP.AUTO: 5.5 [PH] (ref 5–8)
PROT UR STRIP.AUTO-MCNC: NEGATIVE MG/DL
RBC CLUMPS #/AREA URNS AUTO: 0 /HPF (ref 0–4)
SP GR UR STRIP.AUTO: 1.01 (ref 1–1.03)
UA DIPSTICK W REFLEX MICRO PNL UR: YES
URN SPEC COLLECT METH UR: ABNORMAL
UROBILINOGEN UR STRIP-ACNC: 0.2 E.U./DL
WBC #/AREA URNS AUTO: 368 /HPF (ref 0–5)

## 2023-04-22 LAB
BACTERIA UR CULT: ABNORMAL
ORGANISM: ABNORMAL

## 2023-06-09 DIAGNOSIS — I25.10 CORONARY ARTERY DISEASE INVOLVING NATIVE CORONARY ARTERY OF NATIVE HEART WITHOUT ANGINA PECTORIS: ICD-10-CM

## 2023-06-09 DIAGNOSIS — E78.2 MIXED HYPERLIPIDEMIA: ICD-10-CM

## 2023-06-09 RX ORDER — ATORVASTATIN CALCIUM 40 MG/1
40 TABLET, FILM COATED ORAL DAILY
Qty: 90 TABLET | Refills: 3 | Status: SHIPPED | OUTPATIENT
Start: 2023-06-09

## 2023-06-09 NOTE — TELEPHONE ENCOUNTER
----- Message from Les Israel sent at 6/9/2023 10:44 AM EDT -----  Subject: Refill Request    QUESTIONS  Name of Medication? atorvastatin (LIPITOR) 40 MG tablet  Patient-reported dosage and instructions? 40 mg daily  How many days do you have left? 3  Preferred Pharmacy? [facility] [id number]  Pharmacy phone number (if available)? [phone]  ---------------------------------------------------------------------------  --------------  Colby Diamond INFO  What is the best way for the office to contact you? OK to leave message on   voicemail  Preferred Call Back Phone Number? [phone]  ---------------------------------------------------------------------------  --------------  SCRIPT ANSWERS  Relationship to Patient?  Self

## 2023-06-09 NOTE — TELEPHONE ENCOUNTER
Medication:   Requested Prescriptions     Pending Prescriptions Disp Refills    atorvastatin (LIPITOR) 40 MG tablet 90 tablet 3     Sig: Take 1 tablet by mouth daily        Last Filled:  5/11/22    Patient Phone Number: [phone] (home)     Last appt: 1/4/2023   Next appt: 10/24/2023    Last OARRS: No flowsheet data found.

## 2023-09-27 SDOH — ECONOMIC STABILITY: INCOME INSECURITY: HOW HARD IS IT FOR YOU TO PAY FOR THE VERY BASICS LIKE FOOD, HOUSING, MEDICAL CARE, AND HEATING?: NOT VERY HARD

## 2023-09-27 SDOH — ECONOMIC STABILITY: TRANSPORTATION INSECURITY
IN THE PAST 12 MONTHS, HAS LACK OF TRANSPORTATION KEPT YOU FROM MEETINGS, WORK, OR FROM GETTING THINGS NEEDED FOR DAILY LIVING?: NO

## 2023-09-27 SDOH — ECONOMIC STABILITY: HOUSING INSECURITY
IN THE LAST 12 MONTHS, WAS THERE A TIME WHEN YOU DID NOT HAVE A STEADY PLACE TO SLEEP OR SLEPT IN A SHELTER (INCLUDING NOW)?: NO

## 2023-09-27 SDOH — ECONOMIC STABILITY: FOOD INSECURITY: WITHIN THE PAST 12 MONTHS, YOU WORRIED THAT YOUR FOOD WOULD RUN OUT BEFORE YOU GOT MONEY TO BUY MORE.: NEVER TRUE

## 2023-09-27 SDOH — ECONOMIC STABILITY: FOOD INSECURITY: WITHIN THE PAST 12 MONTHS, THE FOOD YOU BOUGHT JUST DIDN'T LAST AND YOU DIDN'T HAVE MONEY TO GET MORE.: NEVER TRUE

## 2023-09-28 ENCOUNTER — OFFICE VISIT (OUTPATIENT)
Dept: PRIMARY CARE CLINIC | Age: 68
End: 2023-09-28

## 2023-09-28 VITALS
DIASTOLIC BLOOD PRESSURE: 66 MMHG | TEMPERATURE: 97.2 F | HEIGHT: 63 IN | SYSTOLIC BLOOD PRESSURE: 130 MMHG | WEIGHT: 133 LBS | HEART RATE: 71 BPM | OXYGEN SATURATION: 98 % | BODY MASS INDEX: 23.57 KG/M2

## 2023-09-28 DIAGNOSIS — S90.859A WOOD SPLINTER IN FOOT: Primary | ICD-10-CM

## 2023-09-28 NOTE — PROGRESS NOTES
[address] [facility]. PRIMARY CARE  [address] [facility] [address]  Dept: [phone]  Dept Fax: [phone]     9/28/2023      Dhara Ricci   1955     Chief Complaint   Patient presents with    Foreign Body in Skin     Left foot. Has been there for 2 weeks. Has tried repeatedly to remove and has not been able to remove. Very tender but does not think it is infected yet. HPI     Patient states she has a splinter in the bottom of her left foot that's been there about 2 weeks. Her daughter has tried to remove it with tweezers but was unable to get it out. She denies any redness, drainage, or fevers. 1/4/2023     3:34 PM 10/14/2022     9:23 AM 10/8/2022     9:26 AM 4/21/2022    11:13 AM 4/14/2022    11:51 AM 12/15/2021    12:51 PM   PHQ Scores   PHQ2 Score 0 0 0 0 0 0   PHQ9 Score 0 0 0 0 0 0     Interpretation of Total Score Depression Severity: 1-4 = Minimal depression, 5-9 = Mild depression, 10-14 = Moderate depression, 15-19 = Moderately severe depression, 20-27 = Severe depression     Prior to Visit Medications    Medication Sig Taking?  Authorizing Provider   atorvastatin (LIPITOR) 40 MG tablet Take 1 tablet by mouth daily Yes Amy Estevez MD   Ascorbic Acid (VITAMIN C PO) Take 1,000 mg by mouth daily Yes Historical Provider, MD   aspirin 81 MG chewable tablet Take 1 tablet by mouth daily Yes Historical Provider, MD   Calcium Carb-Cholecalciferol 600-800 MG-UNIT TABS Take 1 tablet by mouth daily Yes Historical Provider, MD   Multiple Vitamins-Minerals (MULTIVITAMIN ADULT PO) Take 1 tablet by mouth daily Yes Historical Provider, MD   Omega-3 Fatty Acids (FISH OIL OMEGA-3) 1000 MG CAPS Take 1 capsule by mouth daily Yes Historical Provider, MD   cranberry 475 MG CAPS per capsule Take 4,200 mg by mouth daily Yes Historical Provider, MD   oxybutynin (DITROPAN XL) 15 MG extended release tablet Take 1 tablet by mouth every evening Yes Historical Provider,

## 2023-10-19 ENCOUNTER — OFFICE VISIT (OUTPATIENT)
Dept: PRIMARY CARE CLINIC | Age: 68
End: 2023-10-19

## 2023-10-19 VITALS
WEIGHT: 135 LBS | SYSTOLIC BLOOD PRESSURE: 126 MMHG | TEMPERATURE: 97.2 F | HEIGHT: 63 IN | BODY MASS INDEX: 23.92 KG/M2 | DIASTOLIC BLOOD PRESSURE: 49 MMHG | HEART RATE: 76 BPM | OXYGEN SATURATION: 95 %

## 2023-10-19 DIAGNOSIS — R30.9 PAIN WITH URINATION: Primary | ICD-10-CM

## 2023-10-19 LAB
BILIRUBIN, POC: ABNORMAL
BLOOD URINE, POC: ABNORMAL
CLARITY, POC: ABNORMAL
COLOR, POC: YELLOW
GLUCOSE URINE, POC: ABNORMAL
KETONES, POC: ABNORMAL
LEUKOCYTE EST, POC: POSITIVE
NITRITE, POC: POSITIVE
PH, POC: 5.5
PROTEIN, POC: ABNORMAL
SPECIFIC GRAVITY, POC: 1.02
UROBILINOGEN, POC: 0.2

## 2023-10-19 RX ORDER — NITROFURANTOIN 25; 75 MG/1; MG/1
100 CAPSULE ORAL 2 TIMES DAILY
Qty: 10 CAPSULE | Refills: 0 | Status: SHIPPED | OUTPATIENT
Start: 2023-10-19 | End: 2023-10-24

## 2023-10-19 ASSESSMENT — ENCOUNTER SYMPTOMS
SORE THROAT: 0
ABDOMINAL PAIN: 0
COUGH: 0

## 2023-10-21 LAB
BACTERIA UR CULT: ABNORMAL
ORGANISM: ABNORMAL

## 2023-10-23 SDOH — HEALTH STABILITY: PHYSICAL HEALTH: ON AVERAGE, HOW MANY DAYS PER WEEK DO YOU ENGAGE IN MODERATE TO STRENUOUS EXERCISE (LIKE A BRISK WALK)?: 3 DAYS

## 2023-10-23 SDOH — HEALTH STABILITY: PHYSICAL HEALTH: ON AVERAGE, HOW MANY MINUTES DO YOU ENGAGE IN EXERCISE AT THIS LEVEL?: 60 MIN

## 2023-10-23 ASSESSMENT — LIFESTYLE VARIABLES
HOW OFTEN DO YOU HAVE A DRINK CONTAINING ALCOHOL: MONTHLY OR LESS
HOW MANY STANDARD DRINKS CONTAINING ALCOHOL DO YOU HAVE ON A TYPICAL DAY: 1
HOW MANY STANDARD DRINKS CONTAINING ALCOHOL DO YOU HAVE ON A TYPICAL DAY: 1 OR 2
HOW OFTEN DO YOU HAVE A DRINK CONTAINING ALCOHOL: 2
HOW OFTEN DO YOU HAVE SIX OR MORE DRINKS ON ONE OCCASION: 1

## 2023-10-23 ASSESSMENT — PATIENT HEALTH QUESTIONNAIRE - PHQ9
SUM OF ALL RESPONSES TO PHQ QUESTIONS 1-9: 0
SUM OF ALL RESPONSES TO PHQ9 QUESTIONS 1 & 2: 0
SUM OF ALL RESPONSES TO PHQ QUESTIONS 1-9: 0
2. FEELING DOWN, DEPRESSED OR HOPELESS: 0
SUM OF ALL RESPONSES TO PHQ QUESTIONS 1-9: 0
SUM OF ALL RESPONSES TO PHQ QUESTIONS 1-9: 0
1. LITTLE INTEREST OR PLEASURE IN DOING THINGS: 0

## 2023-10-24 ENCOUNTER — OFFICE VISIT (OUTPATIENT)
Dept: PRIMARY CARE CLINIC | Age: 68
End: 2023-10-24

## 2023-10-24 VITALS
WEIGHT: 135 LBS | BODY MASS INDEX: 23.92 KG/M2 | HEART RATE: 65 BPM | HEIGHT: 63 IN | SYSTOLIC BLOOD PRESSURE: 139 MMHG | DIASTOLIC BLOOD PRESSURE: 68 MMHG

## 2023-10-24 DIAGNOSIS — E78.2 MIXED HYPERLIPIDEMIA: ICD-10-CM

## 2023-10-24 DIAGNOSIS — I25.10 CORONARY ARTERY DISEASE INVOLVING NATIVE CORONARY ARTERY OF NATIVE HEART WITHOUT ANGINA PECTORIS: ICD-10-CM

## 2023-10-24 DIAGNOSIS — Z00.00 INITIAL MEDICARE ANNUAL WELLNESS VISIT: Primary | ICD-10-CM

## 2023-10-24 DIAGNOSIS — Z13.29 SCREENING FOR THYROID DISORDER: ICD-10-CM

## 2023-10-24 DIAGNOSIS — E61.1 IRON DEFICIENCY: ICD-10-CM

## 2023-10-24 ASSESSMENT — PATIENT HEALTH QUESTIONNAIRE - PHQ9
SUM OF ALL RESPONSES TO PHQ QUESTIONS 1-9: 0
SUM OF ALL RESPONSES TO PHQ9 QUESTIONS 1 & 2: 0
SUM OF ALL RESPONSES TO PHQ QUESTIONS 1-9: 0
SUM OF ALL RESPONSES TO PHQ QUESTIONS 1-9: 0
1. LITTLE INTEREST OR PLEASURE IN DOING THINGS: 0
2. FEELING DOWN, DEPRESSED OR HOPELESS: 0
SUM OF ALL RESPONSES TO PHQ QUESTIONS 1-9: 0

## 2023-10-24 ASSESSMENT — LIFESTYLE VARIABLES
HOW OFTEN DO YOU HAVE A DRINK CONTAINING ALCOHOL: MONTHLY OR LESS
HOW MANY STANDARD DRINKS CONTAINING ALCOHOL DO YOU HAVE ON A TYPICAL DAY: 1 OR 2

## 2023-10-24 NOTE — PROGRESS NOTES
tablet Take 1 tablet by mouth every evening Yes Provider, Historical, MD       CareTeam (Including outside providers/suppliers regularly involved in providing care):   Patient Care Team:  Sanchez Chowdary DO as PCP - General (Family Medicine)  Sagrario Olmos MD as PCP - Empaneled Provider     Reviewed and updated this visit:  Tobacco  Allergies  Meds  Med Hx  Surg Hx  Soc Hx  Fam Hx      I, Jessy Costa LPN, 16/11/5193, performed the documented evaluation under the direct supervision of the attending physician.

## 2023-11-07 ENCOUNTER — OFFICE VISIT (OUTPATIENT)
Dept: PRIMARY CARE CLINIC | Age: 68
End: 2023-11-07

## 2023-11-07 VITALS
TEMPERATURE: 97.7 F | WEIGHT: 136.6 LBS | BODY MASS INDEX: 24.2 KG/M2 | HEART RATE: 80 BPM | HEIGHT: 63 IN | SYSTOLIC BLOOD PRESSURE: 120 MMHG | DIASTOLIC BLOOD PRESSURE: 58 MMHG

## 2023-11-07 DIAGNOSIS — M80.08XA AGE-RELATED OSTEOPOROSIS WITH CURRENT PATHOLOGICAL FRACTURE, VERTEBRA(E), INITIAL ENCOUNTER FOR FRACTURE (HCC): ICD-10-CM

## 2023-11-07 DIAGNOSIS — J44.9 COPD, MILD (HCC): Primary | ICD-10-CM

## 2023-11-07 DIAGNOSIS — M85.80 OSTEOPENIA, UNSPECIFIED LOCATION: ICD-10-CM

## 2023-11-07 DIAGNOSIS — I25.10 CORONARY ARTERY DISEASE INVOLVING NATIVE CORONARY ARTERY OF NATIVE HEART WITHOUT ANGINA PECTORIS: ICD-10-CM

## 2023-11-07 RX ORDER — FLUTICASONE FUROATE, UMECLIDINIUM BROMIDE AND VILANTEROL TRIFENATATE 200; 62.5; 25 UG/1; UG/1; UG/1
1 POWDER RESPIRATORY (INHALATION) DAILY
Qty: 60 EACH | Refills: 11 | Status: SHIPPED | OUTPATIENT
Start: 2023-11-07 | End: 2023-11-10 | Stop reason: SDUPTHER

## 2023-11-07 ASSESSMENT — ENCOUNTER SYMPTOMS
CHOKING: 0
SHORTNESS OF BREATH: 1
CHEST TIGHTNESS: 0
NAUSEA: 0
WHEEZING: 0
APNEA: 0

## 2023-11-07 NOTE — PROGRESS NOTES
[facility]  Establish care visit   2023    Ezio Rizo (:  1955) is a 79 y.o. female, here to establish care. Chief Complaint   Patient presents with    Annual Exam    New Patient    COPD        ASSESSMENT/ PLAN  1. COPD, mild ([address])  Uncontrolled, initiate Trelegy daily. Has quit smoking.  - fluticasone-umeclidin-vilant (TRELEGY ELLIPTA) 200-62.5-25 MCG/ACT AEPB inhaler; Inhale 1 puff into the lungs daily  Dispense: 60 each; Refill: 11    2. Osteopenia, unspecified location  Update DEXA, continue calcium and vitamin D  - DEXA BONE DENSITY 2 SITES; Future    3. Coronary artery disease involving native coronary artery of native heart without angina pectoris  Normal stress test . Lipid panel reviewed, continue Lipitor    Return in about 9 months (around 2024) for CAD, HLD. HPI  Patient presents today to establish care. Notes that she was a former smoker, and was diagnosed with COPD. She has had increasing shortness of breath exposure to allergens outside, and climbing her stairs at home. No chest pain, lightheadedness, palpitations, diaphoresis or lower extremity swelling. She was told that this may occur at some point in her disease process. Currently does not take any inhalers, but would like to start daily maintenance therapy at this time. Previous DEXA scan revealed osteopenia. She is currently taking vitamin D and calcium as recommended. She does have a history of CAD, and is adherent to her Lipitor. ROS  Review of Systems   Constitutional:  Negative for activity change and unexpected weight change. HENT:  Negative for tinnitus. Eyes:  Negative for visual disturbance. Respiratory:  Positive for shortness of breath. Negative for apnea, choking, chest tightness and wheezing. Cardiovascular:  Negative for chest pain, palpitations and leg swelling. Gastrointestinal:  Negative for nausea. Genitourinary:  Negative for decreased urine volume.

## 2023-11-09 ENCOUNTER — TELEPHONE (OUTPATIENT)
Dept: PRIMARY CARE CLINIC | Age: 68
End: 2023-11-09

## 2023-11-09 NOTE — TELEPHONE ENCOUNTER
Pt was sent in [facility] however even with copay card its extremely expensive. Would like to know if there is something cheaper she can get.

## 2023-11-09 NOTE — TELEPHONE ENCOUNTER
Yes, please ask her if she has coverage for breztri, advair or spiriva? She may need to contact her insurance.

## 2023-11-09 NOTE — TELEPHONE ENCOUNTER
Pt called insurance and they told her if it was called for a 30 day supply instead of 90 day just add refills it will be an amt she can afford so would like Trelegy sent for the 30 day

## 2023-11-10 DIAGNOSIS — J44.9 COPD, MILD (HCC): ICD-10-CM

## 2023-11-10 RX ORDER — FLUTICASONE FUROATE, UMECLIDINIUM BROMIDE AND VILANTEROL TRIFENATATE 200; 62.5; 25 UG/1; UG/1; UG/1
1 POWDER RESPIRATORY (INHALATION) DAILY
Qty: 28 EACH | Refills: 0 | Status: SHIPPED | OUTPATIENT
Start: 2023-11-10

## 2023-11-13 NOTE — TELEPHONE ENCOUNTER
Please ask her if she has coverage for breztri, advair or spiriva? She may need to contact her insurance.

## 2023-11-14 DIAGNOSIS — J43.2 CENTRILOBULAR EMPHYSEMA (HCC): Primary | ICD-10-CM

## 2023-11-17 ENCOUNTER — HOSPITAL ENCOUNTER (OUTPATIENT)
Dept: GENERAL RADIOLOGY | Age: 68
Discharge: HOME | End: 2023-11-17
Payer: MEDICARE

## 2023-11-17 DIAGNOSIS — M80.08XA AGE-RELATED OSTEOPOROSIS WITH CURRENT PATHOLOGICAL FRACTURE, VERTEBRA(E), INITIAL ENCOUNTER FOR FRACTURE (HCC): ICD-10-CM

## 2023-11-17 DIAGNOSIS — M85.80 OSTEOPENIA, UNSPECIFIED LOCATION: ICD-10-CM

## 2023-11-17 PROCEDURE — 77080 DXA BONE DENSITY AXIAL: CPT

## 2024-01-24 ENCOUNTER — TELEPHONE (OUTPATIENT)
Dept: PRIMARY CARE CLINIC | Age: 69
End: 2024-01-24

## 2024-01-24 DIAGNOSIS — J44.9 COPD, MILD (HCC): Primary | ICD-10-CM

## 2024-01-24 RX ORDER — FLUTICASONE FUROATE AND VILANTEROL 100; 25 UG/1; UG/1
1 POWDER RESPIRATORY (INHALATION) DAILY
Qty: 28 EACH | Refills: 11 | Status: SHIPPED | OUTPATIENT
Start: 2024-01-24

## 2024-01-24 NOTE — TELEPHONE ENCOUNTER
Pt called in said her insurance will no longer cover budeson-sormoterol.    But they will cover the following medication    Advair-[facility] 30day supply    Breoellipta    [place]    [facility]  [address] [place] [address], [address] [phone]

## 2024-02-27 ENCOUNTER — OFFICE VISIT (OUTPATIENT)
Dept: PRIMARY CARE CLINIC | Age: 69
End: 2024-02-27

## 2024-02-27 VITALS
HEART RATE: 65 BPM | BODY MASS INDEX: 23.81 KG/M2 | TEMPERATURE: 97.9 F | DIASTOLIC BLOOD PRESSURE: 84 MMHG | WEIGHT: 134.4 LBS | SYSTOLIC BLOOD PRESSURE: 160 MMHG

## 2024-02-27 DIAGNOSIS — B34.9 VIRAL INFECTION: Primary | ICD-10-CM

## 2024-02-27 LAB
Lab: ABNORMAL
QC PASS/FAIL: ABNORMAL
S PYO AG THROAT QL: NORMAL
SARS-COV-2 RDRP RESP QL NAA+PROBE: POSITIVE

## 2024-02-27 ASSESSMENT — ENCOUNTER SYMPTOMS
SINUS PRESSURE: 1
WHEEZING: 0
COUGH: 0
SORE THROAT: 1
SHORTNESS OF BREATH: 0

## 2024-02-27 ASSESSMENT — PATIENT HEALTH QUESTIONNAIRE - PHQ9
SUM OF ALL RESPONSES TO PHQ QUESTIONS 1-9: 0
SUM OF ALL RESPONSES TO PHQ QUESTIONS 1-9: 0
2. FEELING DOWN, DEPRESSED OR HOPELESS: 0
1. LITTLE INTEREST OR PLEASURE IN DOING THINGS: 0
SUM OF ALL RESPONSES TO PHQ9 QUESTIONS 1 & 2: 0
SUM OF ALL RESPONSES TO PHQ QUESTIONS 1-9: 0
SUM OF ALL RESPONSES TO PHQ QUESTIONS 1-9: 0

## 2024-02-27 NOTE — PROGRESS NOTES
[facility]  [facility]  [address] [place] [id number]  Dept: [phone]  Dept Fax: [phone]     2/27/2024      Nani Reid   1955     Chief Complaint   Patient presents with    Pharyngitis     Started 2/25 congestion, body ache fever unknow.     Diarrhea       HPI     Patient states on Sunday she started with sore throat and congestion. She has bilateral ear pressure/muffled feeling. She has had body aches and had chills but doesn't think she's had a fever. This morning she had some diarrhea. She has been taking Mucinex and Ibuprofen for symptoms with mild improvement.           2/27/2024     9:08 AM 10/24/2023     9:35 AM 10/23/2023    10:24 AM 1/4/2023     3:34 PM 10/14/2022     9:23 AM 10/8/2022     9:26 AM 4/21/2022    11:13 AM   PHQ Scores   PHQ2 Score 0 0 0 0 0 0 0   PHQ9 Score 0 0 0 0 0 0 0     Interpretation of Total Score Depression Severity: 1-4 = Minimal depression, 5-9 = Mild depression, 10-14 = Moderate depression, 15-19 = Moderately severe depression, 20-27 = Severe depression     Prior to Visit Medications    Medication Sig Taking? Authorizing Provider   fluticasone furoate-vilanterol (BREO ELLIPTA) 100-25 MCG/ACT inhaler Inhale 1 puff into the lungs daily Yes Sofía Morales DO   atorvastatin (LIPITOR) 40 MG tablet Take 1 tablet by mouth daily Yes Allison Jorge MD   Ascorbic Acid (VITAMIN C PO) Take 1,000 mg by mouth daily Yes ProviderRadha MD   aspirin 81 MG chewable tablet Take 1 tablet by mouth daily Yes Radha Boyd MD   Calcium Carb-Cholecalciferol 600-800 MG-UNIT TABS Take 1 tablet by mouth daily Yes Radha Boyd MD   Multiple Vitamins-Minerals (MULTIVITAMIN ADULT PO) Take 1 tablet by mouth daily Yes Radha Boyd MD   Omega-3 Fatty Acids (FISH OIL OMEGA-3) 1000 MG CAPS Take 1 capsule by mouth daily Yes Radha Boyd MD   cranberry 475 MG CAPS per capsule Take 4,200 mg by mouth

## 2024-03-19 ENCOUNTER — TELEPHONE (OUTPATIENT)
Dept: CASE MANAGEMENT | Age: 69
End: 2024-03-19

## 2024-03-19 NOTE — TELEPHONE ENCOUNTER
Patient due for annual CT Lung Screening. Reminder letter mailed.      Genoveva ZAMORANON, RN   Lung Nodule Navigator  [facility]  [phone]

## 2024-03-21 ENCOUNTER — OFFICE VISIT (OUTPATIENT)
Dept: PRIMARY CARE CLINIC | Age: 69
End: 2024-03-21

## 2024-03-21 VITALS
OXYGEN SATURATION: 93 % | SYSTOLIC BLOOD PRESSURE: 132 MMHG | TEMPERATURE: 97.4 F | DIASTOLIC BLOOD PRESSURE: 66 MMHG | HEIGHT: 63 IN | WEIGHT: 136.6 LBS | BODY MASS INDEX: 24.2 KG/M2 | HEART RATE: 61 BPM

## 2024-03-21 DIAGNOSIS — Z12.2 ENCOUNTER FOR SCREENING FOR MALIGNANT NEOPLASM OF RESPIRATORY ORGANS: Primary | ICD-10-CM

## 2024-03-21 DIAGNOSIS — I25.10 CORONARY ARTERY DISEASE INVOLVING NATIVE CORONARY ARTERY OF NATIVE HEART WITHOUT ANGINA PECTORIS: ICD-10-CM

## 2024-03-21 DIAGNOSIS — J44.9 COPD, MILD (HCC): ICD-10-CM

## 2024-03-21 RX ORDER — ALBUTEROL SULFATE 90 UG/1
2 AEROSOL, METERED RESPIRATORY (INHALATION) 4 TIMES DAILY PRN
Qty: 54 G | Refills: 1 | Status: SHIPPED | OUTPATIENT
Start: 2024-03-21

## 2024-03-21 ASSESSMENT — ENCOUNTER SYMPTOMS
SHORTNESS OF BREATH: 1
NAUSEA: 0
WHEEZING: 0
COUGH: 0
APNEA: 0
CHEST TIGHTNESS: 0

## 2024-03-21 NOTE — PROGRESS NOTES
[facility]  3/21/2024    Nani Reid (:  1955) is a 68 y.o. female, here for evaluation of the following medical concerns:    Chief Complaint   Patient presents with    Shortness of Breath     For a couple of months pt does have copd         ASSESSMENT/ PLAN  1. COPD, mild (HCC)  Uncontrolled although not in exacerbation today.  SpO2 93%, vitals otherwise within normal limits.  Continue Breo, and add albuterol as needed.  Update CT lung cancer screening.  Previous PFTs reviewed.  EKG performed in the office today with normal sinus rhythm, bradycardia.  Referral placed to pulmonology for continued evaluation and management.  - albuterol sulfate HFA (VENTOLIN HFA) 108 (90 Base) MCG/ACT inhaler; Inhale 2 puffs into the lungs 4 times daily as needed for Wheezing  Dispense: 54 g; Refill: 1  - EKG 12 Lead - Clinic Performed  - Luis Rodriguez MD, Pulmonary, [place]    2. Encounter for screening for malignant neoplasm of respiratory organs  - CT Lung Screen (Initial or Annual); Future    3. Coronary artery disease involving native coronary artery of native heart without angina pectoris  Controlled, continue Lipitor.  Stress test reviewed.  Update CT cardiac calcium scoring.  - CT CARDIAC CALCIUM SCORING; Future     Return in about 6 months (around 2024), or if symptoms worsen or fail to improve, for CAD, COPD.    HPI  Patient presents today for concerns of shortness of breath with exertion.    She has been diagnosed with COPD, and is using her Breo Ellipta inhaler daily as prescribed.  She does not have a rescue inhaler.  States that when she climbs the stairs or unloads groceries, she has to pause and catch her breath at times for the past few months.  No chest pain, palpitations, cough, wheezing, lightheadedness associated.  No recent travel, cancer history or period of sedentary lifestyle.  She has quit smoking.    History also significant for CAD.  Last stress test in

## 2024-03-27 ENCOUNTER — HOSPITAL ENCOUNTER (OUTPATIENT)
Dept: CT IMAGING | Age: 69
Discharge: HOME | End: 2024-03-27
Payer: MEDICARE

## 2024-03-27 DIAGNOSIS — I25.10 CORONARY ARTERY DISEASE INVOLVING NATIVE CORONARY ARTERY OF NATIVE HEART WITHOUT ANGINA PECTORIS: ICD-10-CM

## 2024-03-27 DIAGNOSIS — Z12.2 ENCOUNTER FOR SCREENING FOR MALIGNANT NEOPLASM OF RESPIRATORY ORGANS: ICD-10-CM

## 2024-03-27 PROCEDURE — 75571 CT HRT W/O DYE W/CA TEST: CPT

## 2024-03-27 PROCEDURE — 71271 CT THORAX LUNG CANCER SCR C-: CPT

## 2024-03-28 NOTE — RESULT ENCOUNTER NOTE
Pt stated she has been having shortness of breath but it is better since her new inhalers. Pt stated she will stay away from fried foods and watch her shortness of breath. She declines to see the cardiologist at this time.

## 2024-04-01 ENCOUNTER — TELEPHONE (OUTPATIENT)
Dept: PRIMARY CARE CLINIC | Age: 69
End: 2024-04-01

## 2024-04-01 DIAGNOSIS — I25.10 CORONARY ARTERY DISEASE INVOLVING NATIVE CORONARY ARTERY OF NATIVE HEART WITHOUT ANGINA PECTORIS: Primary | ICD-10-CM

## 2024-04-01 NOTE — TELEPHONE ENCOUNTER
Pt called said she was told she had a referral to a cardiology doctor. I do not see one in the chart. Please advise.

## 2024-05-14 ENCOUNTER — OFFICE VISIT (OUTPATIENT)
Dept: PULMONOLOGY | Age: 69
End: 2024-05-14
Payer: MEDICARE

## 2024-05-14 VITALS
DIASTOLIC BLOOD PRESSURE: 84 MMHG | HEART RATE: 67 BPM | WEIGHT: 135 LBS | SYSTOLIC BLOOD PRESSURE: 132 MMHG | HEIGHT: 63 IN | OXYGEN SATURATION: 91 % | BODY MASS INDEX: 23.92 KG/M2

## 2024-05-14 DIAGNOSIS — J44.9 MODERATE COPD (CHRONIC OBSTRUCTIVE PULMONARY DISEASE) (HCC): Primary | ICD-10-CM

## 2024-05-14 PROCEDURE — 1036F TOBACCO NON-USER: CPT | Performed by: INTERNAL MEDICINE

## 2024-05-14 PROCEDURE — G8399 PT W/DXA RESULTS DOCUMENT: HCPCS | Performed by: INTERNAL MEDICINE

## 2024-05-14 PROCEDURE — 1090F PRES/ABSN URINE INCON ASSESS: CPT | Performed by: INTERNAL MEDICINE

## 2024-05-14 PROCEDURE — G8420 CALC BMI NORM PARAMETERS: HCPCS | Performed by: INTERNAL MEDICINE

## 2024-05-14 PROCEDURE — 3017F COLORECTAL CA SCREEN DOC REV: CPT | Performed by: INTERNAL MEDICINE

## 2024-05-14 PROCEDURE — G8427 DOCREV CUR MEDS BY ELIG CLIN: HCPCS | Performed by: INTERNAL MEDICINE

## 2024-05-14 PROCEDURE — 1123F ACP DISCUSS/DSCN MKR DOCD: CPT | Performed by: INTERNAL MEDICINE

## 2024-05-14 PROCEDURE — 99214 OFFICE O/P EST MOD 30 MIN: CPT | Performed by: INTERNAL MEDICINE

## 2024-05-14 PROCEDURE — 3023F SPIROM DOC REV: CPT | Performed by: INTERNAL MEDICINE

## 2024-05-14 RX ORDER — UMECLIDINIUM BROMIDE AND VILANTEROL TRIFENATATE 62.5; 25 UG/1; UG/1
1 POWDER RESPIRATORY (INHALATION) DAILY
Qty: 30 EACH | Refills: 11 | Status: SHIPPED | OUTPATIENT
Start: 2024-05-14

## 2024-05-14 ASSESSMENT — ENCOUNTER SYMPTOMS
COUGH: 0
CHEST TIGHTNESS: 0
WHEEZING: 0
SHORTNESS OF BREATH: 1

## 2024-05-14 NOTE — PROGRESS NOTES
[facility]    Outpatient Note    Subjective:   CHIEF COMPLAINT:   Chief Complaint   Patient presents with    Follow-up     Interval history on 5/14/24  She was recently started on Breo.  She is noticing SOB on exertion.  She is currently on Breo.  It is helping.    There is cough or sputum production.  No fever.    Wt is stable.  Has good appetite.  She has albuterol but doesn't use it.      HPI:  9/16/21   The patient is 69 y.o. female who presents today for a new patient visit for evaluation of SOB.  It is mainly with exertion.  It started 2 years ago.  At that time she was found to have nodules.  Over the past few months she noticed more SOB with carrying stuff or going up steps.    There is no cough or sputum production.  There is no fever or chills.   There is no chest pain or palpitations.    Otherwise she feels good.    No prior hx of heart disease, anemia or thyroid disease.      Several years she had pneumonia.  She was not admitted for it.    She quit smoking in 2013.  She started smoking at the age of 18 and smoked 1 PPD.      She can walk 3 flights of stairs but gets really SOB and has to sit.      Past Medical History:    Past Medical History:   Diagnosis Date    CAD (coronary artery disease)     Angiogram 2016    Endometriosis     Former smoker     Hyperlipidemia, mixed     OAB (overactive bladder)     Osteopenia     2019; Rpt DEXA 2021       Social History:    Social History     Tobacco Use   Smoking Status Former    Current packs/day: 0.00    Average packs/day: 1 pack/day for 40.0 years (40.0 ttl pk-yrs)    Types: Cigarettes    Start date: 11/11/1973    Quit date: 6/11/2013    Years since quitting: 10.9   Smokeless Tobacco Never   Tobacco Comments    per radiology questionaire completed by pt 11/23/21       Family History:  Family History   Problem Relation Age of Onset    Lung Cancer Father 38    Cancer Father     Atrial Fibrillation Brother     Cancer Mother

## 2024-05-22 NOTE — PROGRESS NOTES
[facility]     Outpatient Cardiology         Patient Name:  Nani Reid  Requesting Physician: No admitting provider for patient encounter.  Primary Care Physician: Sofía Morales DO    Reason for Consultation/Chief Complaint:   Chief Complaint   Patient presents with    Shortness of Breath     When climbing steps, h/o emphysema and COPD    Hypertension         History of Present Illness:    HPI     Nani Fuentes a 69 y.o. female with PMH of CAD, HLD, COPD, former smoker.   Here to establish care for CAD. Referred by Dr. Sofía Morales.   CAD, on aspirin 81 mg daily, LHC in 2016 showed 10-20% luminal irregularities LAD. 30% mid LAD. 10-20% luminal irregularities LCX. Diffuse luminal irregularities RCA up to 30%.EF 55%. Recently had a CT calcium score 256 (3/28/24) no angina.  Taking Lipitor without any side effects.  HLD,  Last LDL 66 (10/24/23) on Lipitor 40 mg daily, no side effects  Overall feeling very well from a cardiovascular perspective no symptoms suggestive of progression of CAD    PMH  Past Medical History:   Diagnosis Date    CAD (coronary artery disease)     Angiogram 2016    Endometriosis     Former smoker     Hyperlipidemia, mixed     OAB (overactive bladder)     Osteopenia     ; Rpt DEXA        PSH  Past Surgical History:   Procedure Laterality Date    CATARACT REMOVAL Right 2019     SECTION      DILATION AND CURETTAGE OF UTERUS      HYSTERECTOMY (CERVIX STATUS UNKNOWN)      PARTIAL HYSTERECTOMY (CERVIX NOT REMOVED)      2/2 postpartum bleeding        Social HIstory  Social History     Tobacco Use    Smoking status: Former     Current packs/day: 0.00     Average packs/day: 1 pack/day for 40.0 years (40.0 ttl pk-yrs)     Types: Cigarettes     Start date: 1973     Quit date: 2013     Years since quitting: 10.9    Smokeless tobacco: Never    Tobacco comments:     per radiology questionaire completed by pt 21   Vaping Use    Vaping

## 2024-05-23 ENCOUNTER — OFFICE VISIT (OUTPATIENT)
Dept: CARDIOLOGY CLINIC | Age: 69
End: 2024-05-23
Payer: MEDICARE

## 2024-05-23 VITALS
DIASTOLIC BLOOD PRESSURE: 90 MMHG | OXYGEN SATURATION: 95 % | BODY MASS INDEX: 24.2 KG/M2 | SYSTOLIC BLOOD PRESSURE: 170 MMHG | WEIGHT: 136.6 LBS | HEART RATE: 72 BPM

## 2024-05-23 DIAGNOSIS — I10 PRIMARY HYPERTENSION: ICD-10-CM

## 2024-05-23 DIAGNOSIS — E78.2 MIXED HYPERLIPIDEMIA: ICD-10-CM

## 2024-05-23 DIAGNOSIS — I25.10 CORONARY ARTERY DISEASE INVOLVING NATIVE CORONARY ARTERY OF NATIVE HEART WITHOUT ANGINA PECTORIS: Primary | ICD-10-CM

## 2024-05-23 PROCEDURE — 3077F SYST BP >= 140 MM HG: CPT | Performed by: INTERNAL MEDICINE

## 2024-05-23 PROCEDURE — 1123F ACP DISCUSS/DSCN MKR DOCD: CPT | Performed by: INTERNAL MEDICINE

## 2024-05-23 PROCEDURE — G8399 PT W/DXA RESULTS DOCUMENT: HCPCS | Performed by: INTERNAL MEDICINE

## 2024-05-23 PROCEDURE — 1090F PRES/ABSN URINE INCON ASSESS: CPT | Performed by: INTERNAL MEDICINE

## 2024-05-23 PROCEDURE — G8427 DOCREV CUR MEDS BY ELIG CLIN: HCPCS | Performed by: INTERNAL MEDICINE

## 2024-05-23 PROCEDURE — 3080F DIAST BP >= 90 MM HG: CPT | Performed by: INTERNAL MEDICINE

## 2024-05-23 PROCEDURE — 1036F TOBACCO NON-USER: CPT | Performed by: INTERNAL MEDICINE

## 2024-05-23 PROCEDURE — 3017F COLORECTAL CA SCREEN DOC REV: CPT | Performed by: INTERNAL MEDICINE

## 2024-05-23 PROCEDURE — 99204 OFFICE O/P NEW MOD 45 MIN: CPT | Performed by: INTERNAL MEDICINE

## 2024-05-23 PROCEDURE — G8420 CALC BMI NORM PARAMETERS: HCPCS | Performed by: INTERNAL MEDICINE

## 2024-05-23 ASSESSMENT — ENCOUNTER SYMPTOMS
EYE DISCHARGE: 0
ABDOMINAL DISTENTION: 0
BLOOD IN STOOL: 0
COLOR CHANGE: 0
CHEST TIGHTNESS: 0
BACK PAIN: 0
COUGH: 0
WHEEZING: 0
ABDOMINAL PAIN: 0
FACIAL SWELLING: 0
VOMITING: 0
SHORTNESS OF BREATH: 0

## 2024-05-23 NOTE — ASSESSMENT & PLAN NOTE
BP high today, normal in March, high again in February.  Patient is hesitant to start medications  Will monitor BP over the next week or 2 and will let us know BP reads

## 2024-06-08 DIAGNOSIS — I25.10 CORONARY ARTERY DISEASE INVOLVING NATIVE CORONARY ARTERY OF NATIVE HEART WITHOUT ANGINA PECTORIS: ICD-10-CM

## 2024-06-08 DIAGNOSIS — E78.2 MIXED HYPERLIPIDEMIA: ICD-10-CM

## 2024-06-10 RX ORDER — ATORVASTATIN CALCIUM 40 MG/1
40 TABLET, FILM COATED ORAL DAILY
Qty: 90 TABLET | Refills: 3 | Status: SHIPPED | OUTPATIENT
Start: 2024-06-10

## 2024-06-10 NOTE — TELEPHONE ENCOUNTER
Medication:   Requested Prescriptions     Pending Prescriptions Disp Refills    atorvastatin (LIPITOR) 40 MG tablet [Pharmacy Med Name: ATORVASTATIN 40 MG TABLET] 90 tablet 3     Sig: TAKE 1 TABLET BY MOUTH DAILY        Last Filled:  6/9/23    Last appt: 3/21/2024   Next appt: 10/24/2024    Last OARRS:        No data to display

## 2024-10-24 ENCOUNTER — TELEMEDICINE (OUTPATIENT)
Dept: PRIMARY CARE CLINIC | Age: 69
End: 2024-10-24

## 2024-10-24 DIAGNOSIS — Z00.00 MEDICARE ANNUAL WELLNESS VISIT, SUBSEQUENT: Primary | ICD-10-CM

## 2024-10-24 SDOH — ECONOMIC STABILITY: FOOD INSECURITY: WITHIN THE PAST 12 MONTHS, YOU WORRIED THAT YOUR FOOD WOULD RUN OUT BEFORE YOU GOT MONEY TO BUY MORE.: NEVER TRUE

## 2024-10-24 SDOH — ECONOMIC STABILITY: FOOD INSECURITY: WITHIN THE PAST 12 MONTHS, THE FOOD YOU BOUGHT JUST DIDN'T LAST AND YOU DIDN'T HAVE MONEY TO GET MORE.: NEVER TRUE

## 2024-10-24 SDOH — ECONOMIC STABILITY: INCOME INSECURITY: HOW HARD IS IT FOR YOU TO PAY FOR THE VERY BASICS LIKE FOOD, HOUSING, MEDICAL CARE, AND HEATING?: NOT HARD AT ALL

## 2024-10-24 ASSESSMENT — PATIENT HEALTH QUESTIONNAIRE - PHQ9
SUM OF ALL RESPONSES TO PHQ QUESTIONS 1-9: 0
SUM OF ALL RESPONSES TO PHQ9 QUESTIONS 1 & 2: 0
1. LITTLE INTEREST OR PLEASURE IN DOING THINGS: NOT AT ALL
SUM OF ALL RESPONSES TO PHQ QUESTIONS 1-9: 0
2. FEELING DOWN, DEPRESSED OR HOPELESS: NOT AT ALL
SUM OF ALL RESPONSES TO PHQ QUESTIONS 1-9: 0
SUM OF ALL RESPONSES TO PHQ QUESTIONS 1-9: 0

## 2024-10-24 NOTE — PROGRESS NOTES
Medicare Annual Wellness Visit    Nani Reid is here for Medicare AWV    Assessment & Plan   Medicare annual wellness visit, subsequent  Recommendations for Preventive Services Due: see orders and patient instructions/AVS.  Recommended screening schedule for the next 5-10 years is provided to the patient in written form: see Patient Instructions/AVS.     Return for Medicare Annual Wellness Visit in 1 year.     Subjective     Patient's complete Health Risk Assessment and screening values have been reviewed and are found in Flowsheets. The following problems were reviewed today and where indicated follow up appointments were made and/or referrals ordered.    No Positive Risk Factors identified today.                                Objective    Patient-Reported Vitals  Patient-Reported Systolic (Top): 133 mmHg  Patient-Reported Diastolic (Bottom): 84 mmHg               Allergies   Allergen Reactions    Codeine     Morphine And Codeine      vomiting       Prior to Visit Medications    Medication Sig Taking? Authorizing Provider   atorvastatin (LIPITOR) 40 MG tablet TAKE 1 TABLET BY MOUTH DAILY Yes Nisha Lowe APRN - CNP   umeclidinium-vilanterol (ANORO ELLIPTA) 62.5-25 MCG/ACT inhaler Inhale 1 puff into the lungs daily Yes Donald Jauregui MD   albuterol sulfate HFA (VENTOLIN HFA) 108 (90 Base) MCG/ACT inhaler Inhale 2 puffs into the lungs 4 times daily as needed for Wheezing Yes Sofía Morales DO   Ascorbic Acid (VITAMIN C PO) Take 1,000 mg by mouth daily Yes Radha Boyd MD   aspirin 81 MG chewable tablet Take 1 tablet by mouth daily Yes Radha Boyd MD   Calcium Carb-Cholecalciferol 600-800 MG-UNIT TABS Take 1 tablet by mouth daily Yes Radha Boyd MD   Multiple Vitamins-Minerals (MULTIVITAMIN ADULT PO) Take 1 tablet by mouth daily Yes Radha Boyd MD   Omega-3 Fatty Acids (FISH OIL OMEGA-3) 1000 MG CAPS Take 1 capsule by mouth daily Yes Radha Boyd

## 2024-10-24 NOTE — PATIENT INSTRUCTIONS
healthcare professional. [employer] disclaims any warranty or liability for your use of this information.      Personalized Preventive Plan for Nani Reid - 10/24/2024  Medicare offers a range of preventive health benefits. Some of the tests and screenings are paid in full while other may be subject to a deductible, co-insurance, and/or copay.    Some of these benefits include a comprehensive review of your medical history including lifestyle, illnesses that may run in your family, and various assessments and screenings as appropriate.    After reviewing your medical record and screening and assessments performed today your provider may have ordered immunizations, labs, imaging, and/or referrals for you.  A list of these orders (if applicable) as well as your Preventive Care list are included within your After Visit Summary for your review.    Other Preventive Recommendations:    A preventive eye exam performed by an eye specialist is recommended every 1-2 years to screen for glaucoma; cataracts, macular degeneration, and other eye disorders.  A preventive dental visit is recommended every 6 months.  Try to get at least 150 minutes of exercise per week or 10,000 steps per day on a pedometer .  Order or download the FREE \"Exercise & Physical Activity: Your Everyday Guide\" from [employer]. Call 1-222.397.3860 or search [employer] online.  You need 8214-3323 mg of calcium and 9148-7515 IU of vitamin D per day. It is possible to meet your calcium requirement with diet alone, but a vitamin D supplement is usually necessary to meet this goal.  When exposed to the sun, use a sunscreen that protects against both UVA and UVB radiation with an SPF of 30 or greater. Reapply every 2 to 3 hours or after sweating, drying off with a towel, or swimming.  Always wear a seat belt when traveling in a car. Always wear a helmet when riding a bicycle or motorcycle.

## 2024-10-28 ENCOUNTER — OFFICE VISIT (OUTPATIENT)
Dept: PRIMARY CARE CLINIC | Age: 69
End: 2024-10-28

## 2024-10-28 VITALS
HEIGHT: 63 IN | TEMPERATURE: 97.1 F | HEART RATE: 68 BPM | DIASTOLIC BLOOD PRESSURE: 80 MMHG | SYSTOLIC BLOOD PRESSURE: 162 MMHG | BODY MASS INDEX: 24.24 KG/M2 | WEIGHT: 136.8 LBS

## 2024-10-28 DIAGNOSIS — J43.2 CENTRILOBULAR EMPHYSEMA (HCC): ICD-10-CM

## 2024-10-28 DIAGNOSIS — Z00.00 MEDICARE ANNUAL WELLNESS VISIT, SUBSEQUENT: Primary | Chronic | ICD-10-CM

## 2024-10-28 DIAGNOSIS — I25.10 CORONARY ARTERY DISEASE INVOLVING NATIVE CORONARY ARTERY OF NATIVE HEART WITHOUT ANGINA PECTORIS: ICD-10-CM

## 2024-10-28 DIAGNOSIS — I10 PRIMARY HYPERTENSION: ICD-10-CM

## 2024-10-28 RX ORDER — FLUTICASONE FUROATE AND VILANTEROL TRIFENATATE 100; 25 UG/1; UG/1
1 POWDER RESPIRATORY (INHALATION) DAILY
COMMUNITY
Start: 2024-09-06

## 2024-10-28 RX ORDER — LISINOPRIL 10 MG/1
10 TABLET ORAL DAILY
Qty: 30 TABLET | Refills: 1 | Status: SHIPPED | OUTPATIENT
Start: 2024-10-28

## 2024-10-28 ASSESSMENT — PATIENT HEALTH QUESTIONNAIRE - PHQ9
SUM OF ALL RESPONSES TO PHQ QUESTIONS 1-9: 0
SUM OF ALL RESPONSES TO PHQ QUESTIONS 1-9: 0
1. LITTLE INTEREST OR PLEASURE IN DOING THINGS: NOT AT ALL
SUM OF ALL RESPONSES TO PHQ QUESTIONS 1-9: 0
SUM OF ALL RESPONSES TO PHQ9 QUESTIONS 1 & 2: 0
2. FEELING DOWN, DEPRESSED OR HOPELESS: NOT AT ALL
SUM OF ALL RESPONSES TO PHQ QUESTIONS 1-9: 0

## 2024-10-28 NOTE — PROGRESS NOTES
Medicare Annual Wellness Visit    Nani Reid is here for Annual Exam    Assessment & Plan   Medicare annual wellness visit, subsequent    Coronary artery disease involving native coronary artery of native heart without angina pectoris  Update lipid panel and continue Lipitor    Primary hypertension  Uncontrolled, this is new problem.  Initiate lisinopril and check blood pressure at home.  Follow-up in 4 weeks for recheck  -     Comprehensive Metabolic Panel; Future  -     Lipid Panel; Future  -     lisinopril (PRINIVIL;ZESTRIL) 10 MG tablet; Take 1 tablet by mouth daily, Disp-30 tablet, R-1Normal    Centrilobular emphysema (HCC)  Continue maintenance inhaler and smoking cessation    Recommendations for Preventive Services Due: see orders and patient instructions/AVS.  Recommended screening schedule for the next 5-10 years is provided to the patient in written form: see Patient Instructions/AVS.     Return in about 4 weeks (around 11/25/2024) for blood pressure follow-up.     Subjective   The following acute and/or chronic problems were also addressed today:    Elevated blood pressure -persistently elevated in the office today.  Patient notes that she does not check her blood pressure at home.  Currently not on antihypertensives.  She denies chest pain, shortness of breath or lower extremity swelling.    Patient's complete Health Risk Assessment and screening values have been reviewed and are found in Flowsheets. The following problems were reviewed today and where indicated follow up appointments were made and/or referrals ordered.    No Positive Risk Factors identified today.                                    Objective   Vitals:    10/28/24 0921 10/28/24 0948   BP: (!) 170/85 (!) 162/80   Pulse: 68    Temp: 97.1 °F (36.2 °C)    TempSrc: Temporal    Weight: 62.1 kg (136 lb 12.8 oz)    Height: 1.6 m (5' 3\")       Body mass index is 24.23 kg/m².                    Allergies   Allergen Reactions    Codeine

## 2024-10-29 LAB
ALBUMIN SERPL-MCNC: 4.6 G/DL (ref 3.4–5)
ALBUMIN/GLOB SERPL: 1.6 {RATIO} (ref 1.1–2.2)
ALP SERPL-CCNC: 86 U/L (ref 40–129)
ALT SERPL-CCNC: 23 U/L (ref 10–40)
ANION GAP SERPL CALCULATED.3IONS-SCNC: 11 MMOL/L (ref 3–16)
AST SERPL-CCNC: 30 U/L (ref 15–37)
BILIRUB SERPL-MCNC: 0.5 MG/DL (ref 0–1)
BUN SERPL-MCNC: 14 MG/DL (ref 7–20)
CALCIUM SERPL-MCNC: 10 MG/DL (ref 8.3–10.6)
CHLORIDE SERPL-SCNC: 103 MMOL/L (ref 99–110)
CHOLEST SERPL-MCNC: 167 MG/DL (ref 0–199)
CO2 SERPL-SCNC: 27 MMOL/L (ref 21–32)
CREAT SERPL-MCNC: 0.7 MG/DL (ref 0.6–1.2)
GFR SERPLBLD CREATININE-BSD FMLA CKD-EPI: >90 ML/MIN/{1.73_M2}
GLUCOSE SERPL-MCNC: 88 MG/DL (ref 70–99)
HDLC SERPL-MCNC: 55 MG/DL (ref 40–60)
LDLC SERPL CALC-MCNC: 83 MG/DL
POTASSIUM SERPL-SCNC: 4.9 MMOL/L (ref 3.5–5.1)
PROT SERPL-MCNC: 7.5 G/DL (ref 6.4–8.2)
SODIUM SERPL-SCNC: 141 MMOL/L (ref 136–145)
TRIGL SERPL-MCNC: 143 MG/DL (ref 0–150)
VIT B12 SERPL-MCNC: 696 PG/ML (ref 211–911)
VLDLC SERPL CALC-MCNC: 29 MG/DL

## 2024-11-04 ENCOUNTER — OFFICE VISIT (OUTPATIENT)
Dept: PRIMARY CARE CLINIC | Age: 69
End: 2024-11-04

## 2024-11-04 VITALS
TEMPERATURE: 96.6 F | OXYGEN SATURATION: 94 % | HEART RATE: 74 BPM | DIASTOLIC BLOOD PRESSURE: 73 MMHG | SYSTOLIC BLOOD PRESSURE: 165 MMHG | HEIGHT: 63 IN | WEIGHT: 136 LBS | BODY MASS INDEX: 24.1 KG/M2

## 2024-11-04 DIAGNOSIS — J06.9 UPPER RESPIRATORY TRACT INFECTION, UNSPECIFIED TYPE: ICD-10-CM

## 2024-11-04 DIAGNOSIS — J44.1 COPD EXACERBATION (HCC): Primary | ICD-10-CM

## 2024-11-04 RX ORDER — PREDNISONE 20 MG/1
40 TABLET ORAL DAILY
Qty: 10 TABLET | Refills: 0 | Status: SHIPPED | OUTPATIENT
Start: 2024-11-04 | End: 2024-11-09

## 2024-11-04 RX ORDER — AZITHROMYCIN 250 MG/1
TABLET, FILM COATED ORAL
Qty: 6 TABLET | Refills: 0 | Status: SHIPPED | OUTPATIENT
Start: 2024-11-04 | End: 2024-11-14

## 2024-11-04 RX ORDER — PREDNISONE 20 MG/1
20 TABLET ORAL 2 TIMES DAILY
Qty: 10 TABLET | Refills: 0 | Status: CANCELLED | OUTPATIENT
Start: 2024-11-04 | End: 2024-11-09

## 2024-11-04 ASSESSMENT — ENCOUNTER SYMPTOMS
SINUS PRESSURE: 1
SHORTNESS OF BREATH: 0
SINUS PAIN: 1
COUGH: 1
SORE THROAT: 1
WHEEZING: 0

## 2024-11-04 NOTE — PROGRESS NOTES
tablet Take 1 tablet by mouth daily 30 tablet 1    atorvastatin (LIPITOR) 40 MG tablet TAKE 1 TABLET BY MOUTH DAILY 90 tablet 3    umeclidinium-vilanterol (ANORO ELLIPTA) 62.5-25 MCG/ACT inhaler Inhale 1 puff into the lungs daily 30 each 11    Ascorbic Acid (VITAMIN C PO) Take 1,000 mg by mouth daily      aspirin 81 MG chewable tablet Take 1 tablet by mouth daily      Calcium Carb-Cholecalciferol 600-800 MG-UNIT TABS Take 1 tablet by mouth daily      Multiple Vitamins-Minerals (MULTIVITAMIN ADULT PO) Take 1 tablet by mouth daily      Omega-3 Fatty Acids (FISH OIL OMEGA-3) 1000 MG CAPS Take 1 capsule by mouth daily      cranberry 475 MG CAPS per capsule Take 4,200 mg by mouth daily      oxybutynin (DITROPAN XL) 15 MG extended release tablet Take 1 tablet by mouth every evening       No current facility-administered medications on file prior to visit.      Past Medical History:   Diagnosis Date    CAD (coronary artery disease)     Angiogram 2016    Endometriosis     Former smoker     Hyperlipidemia, mixed     OAB (overactive bladder)     Osteopenia     2019; Rpt DEXA 2021     Patient Active Problem List   Diagnosis    Centrilobular emphysema (HCC)    Coronary artery disease involving native coronary artery of native heart without angina pectoris    H/O total hysterectomy    Hyperlipidemia    OAB (overactive bladder)    Osteopenia    Polyp of colon    Moderate COPD (chronic obstructive pulmonary disease) (HCC)    Primary hypertension       PE  Vitals:    11/04/24 0904   BP: (!) 165/73   Pulse: 74   Temp: (!) 96.6 °F (35.9 °C)   SpO2: 94%   Weight: 61.7 kg (136 lb)   Height: 1.6 m (5' 3\")     Estimated body mass index is 24.09 kg/m² as calculated from the following:    Height as of this encounter: 1.6 m (5' 3\").    Weight as of this encounter: 61.7 kg (136 lb).    Physical Exam  Constitutional:       Appearance: Normal appearance.   HENT:      Head: Normocephalic and atraumatic.      Right Ear: External ear normal. A

## 2024-11-11 ENCOUNTER — TELEPHONE (OUTPATIENT)
Dept: PRIMARY CARE CLINIC | Age: 69
End: 2024-11-11

## 2024-11-11 NOTE — TELEPHONE ENCOUNTER
Pt has finished her medications from last week and she is better but still coughing and voice is horse. Would like advice on what to do. She said also didn't know if her bp meds can cause cough.

## 2024-11-12 ENCOUNTER — OFFICE VISIT (OUTPATIENT)
Dept: PRIMARY CARE CLINIC | Age: 69
End: 2024-11-12

## 2024-11-12 VITALS
SYSTOLIC BLOOD PRESSURE: 122 MMHG | HEART RATE: 81 BPM | DIASTOLIC BLOOD PRESSURE: 58 MMHG | HEIGHT: 63 IN | TEMPERATURE: 97.2 F | BODY MASS INDEX: 24.56 KG/M2 | WEIGHT: 138.6 LBS

## 2024-11-12 DIAGNOSIS — J32.9 VIRAL SINUSITIS: ICD-10-CM

## 2024-11-12 DIAGNOSIS — B97.89 VIRAL SINUSITIS: ICD-10-CM

## 2024-11-12 DIAGNOSIS — I10 PRIMARY HYPERTENSION: Primary | ICD-10-CM

## 2024-11-12 RX ORDER — METHYLPREDNISOLONE 4 MG/1
TABLET ORAL
Qty: 21 TABLET | Refills: 0 | Status: SHIPPED | OUTPATIENT
Start: 2024-11-12 | End: 2024-11-18

## 2024-11-12 RX ORDER — LANOLIN ALCOHOL/MO/W.PET/CERES
3 CREAM (GRAM) TOPICAL NIGHTLY PRN
COMMUNITY

## 2024-11-12 RX ORDER — BENZONATATE 100 MG/1
100-200 CAPSULE ORAL 3 TIMES DAILY PRN
Qty: 30 CAPSULE | Refills: 0 | Status: SHIPPED | OUTPATIENT
Start: 2024-11-12 | End: 2024-11-19

## 2024-11-12 RX ORDER — DIPHENHYDRAMINE HCL 25 MG
25 CAPSULE ORAL EVERY 6 HOURS PRN
COMMUNITY

## 2024-11-12 RX ORDER — LOSARTAN POTASSIUM 25 MG/1
25 TABLET ORAL DAILY
Qty: 90 TABLET | Refills: 0 | Status: SHIPPED | OUTPATIENT
Start: 2024-11-12

## 2024-11-12 ASSESSMENT — ENCOUNTER SYMPTOMS
WHEEZING: 0
SINUS PRESSURE: 0
DIARRHEA: 0
VOICE CHANGE: 1
COUGH: 1
VOMITING: 0
SORE THROAT: 0
SHORTNESS OF BREATH: 0
RHINORRHEA: 0
NAUSEA: 0
EYE PAIN: 0

## 2024-11-12 NOTE — PROGRESS NOTES
[facility]  2024    Nani Reid (:  1955) is a 69 y.o. female, here for evaluation of the following medical concerns:    Chief Complaint   Patient presents with    Cough     Still having a cough/horse voice since previously been seeing/ finishing antibiotics         ASSESSMENT/ PLAN  1. Primary hypertension  Controlled, will transition from lisinopril to losartan to eliminate possible side effect of dry cough contributing to the patient's presentation today.  Continue checking home blood pressure readings.  - losartan (COZAAR) 25 MG tablet; Take 1 tablet by mouth daily  Dispense: 90 tablet; Refill: 0    2. Viral sinusitis  Uncontrolled, this is a persistent problem.  Discussed that postviral cough can last up to 6 weeks, and reassurance provided.  No secondary indications today for additional antibiotics.  Discussed signs and symptoms of bacterial sinusitis and pneumonia which would warrant sooner follow-up  - methylPREDNISolone (MEDROL DOSEPACK) 4 MG tablet; Take by mouth.  Dispense: 21 tablet; Refill: 0  - benzonatate (TESSALON) 100 MG capsule; Take 1-2 capsules by mouth 3 times daily as needed for Cough  Dispense: 30 capsule; Refill: 0       Return if symptoms worsen or fail to improve.    HPI  Patient presents today for follow-up on hypertension with persistent cough.    She has completed course of prednisone, azithromycin for bronchitis.  She denies malaise, fever, chills, chest pain or shortness of breath but does have a persistent dry cough.  Of note, she has also recently initiated lisinopril for hypertension.  She does have a persistent hoarse voice.    ROS  Review of Systems   Constitutional:  Negative for activity change, appetite change, fatigue and fever.   HENT:  Positive for voice change. Negative for congestion, ear pain, rhinorrhea, sinus pressure and sore throat.    Eyes:  Negative for pain.   Respiratory:  Positive for cough. Negative for shortness of breath and

## 2024-11-27 ENCOUNTER — OFFICE VISIT (OUTPATIENT)
Dept: PRIMARY CARE CLINIC | Age: 69
End: 2024-11-27

## 2024-11-27 VITALS
WEIGHT: 138.2 LBS | SYSTOLIC BLOOD PRESSURE: 122 MMHG | TEMPERATURE: 98 F | HEIGHT: 63 IN | BODY MASS INDEX: 24.49 KG/M2 | OXYGEN SATURATION: 98 % | DIASTOLIC BLOOD PRESSURE: 80 MMHG | HEART RATE: 60 BPM

## 2024-11-27 DIAGNOSIS — I10 PRIMARY HYPERTENSION: Primary | ICD-10-CM

## 2024-11-27 DIAGNOSIS — L71.9 ROSACEA: ICD-10-CM

## 2024-11-27 ASSESSMENT — ENCOUNTER SYMPTOMS
SHORTNESS OF BREATH: 0
NAUSEA: 0
WHEEZING: 0

## 2024-11-27 NOTE — PROGRESS NOTES
[facility]  2024    Nani Reid (:  1955) is a 69 y.o. female, here for evaluation of the following medical concerns:    Chief Complaint   Patient presents with    Hypertension        ASSESSMENT/ PLAN  1. Primary hypertension  Controlled, continue losartan    2. Rosacea  Controlled, continue metronidazole as needed  - metroNIDAZOLE (METROCREAM) 0.75 % cream; Apply topically 2 times daily.  Dispense: 60 g; Refill: 2       Return in about 6 months (around 2025) for blood pressure follow-up.    HPI  Patient presents today for follow-up on hypertension.  She is requesting a refill of cream for rosacea.    She has been taking and tolerating losartan, daily as prescribed.  Notes that cough has resolved since transitioning from lisinopril to losartan.  She has been checking her blood pressure at home, and it has been within normal limits.    Rosacea well-controlled with metronidazole as needed.  Due for refill today.    ROS  Review of Systems   Constitutional:  Negative for activity change and unexpected weight change.   HENT:  Negative for tinnitus.    Eyes:  Negative for visual disturbance.   Respiratory:  Negative for shortness of breath and wheezing.    Cardiovascular:  Negative for chest pain, palpitations and leg swelling.   Gastrointestinal:  Negative for nausea.   Genitourinary:  Negative for decreased urine volume.   Neurological:  Negative for syncope, light-headedness and headaches.       HISTORIES  Current Outpatient Medications on File Prior to Visit   Medication Sig Dispense Refill    diphenhydrAMINE (BENADRYL) 25 MG capsule Take 1 capsule by mouth every 6 hours as needed for Itching      melatonin 3 MG TABS tablet Take 1 tablet by mouth nightly as needed 15 mg      losartan (COZAAR) 25 MG tablet Take 1 tablet by mouth daily 90 tablet 0    BREO ELLIPTA 100-25 MCG/ACT inhaler Inhale 1 puff into the lungs daily      atorvastatin (LIPITOR) 40 MG tablet TAKE 1 TABLET BY MOUTH

## 2025-01-03 ENCOUNTER — HOSPITAL ENCOUNTER (OUTPATIENT)
Dept: MAMMOGRAPHY | Age: 70
Discharge: HOME | End: 2025-01-03
Payer: MEDICARE

## 2025-01-03 VITALS — HEIGHT: 63 IN | BODY MASS INDEX: 24.45 KG/M2 | WEIGHT: 138 LBS

## 2025-01-03 DIAGNOSIS — Z12.31 VISIT FOR SCREENING MAMMOGRAM: ICD-10-CM

## 2025-01-03 PROCEDURE — 77063 BREAST TOMOSYNTHESIS BI: CPT

## 2025-01-08 DIAGNOSIS — B97.89 VIRAL SINUSITIS: ICD-10-CM

## 2025-01-08 DIAGNOSIS — J32.9 VIRAL SINUSITIS: ICD-10-CM

## 2025-01-08 RX ORDER — METHYLPREDNISOLONE 4 MG/1
TABLET ORAL
Qty: 21 TABLET | Refills: 0 | OUTPATIENT
Start: 2025-01-08

## 2025-02-06 DIAGNOSIS — I10 PRIMARY HYPERTENSION: ICD-10-CM

## 2025-02-06 RX ORDER — LOSARTAN POTASSIUM 25 MG/1
25 TABLET ORAL DAILY
Qty: 90 TABLET | Refills: 1 | Status: SHIPPED | OUTPATIENT
Start: 2025-02-06

## 2025-02-06 NOTE — TELEPHONE ENCOUNTER
Medication:   Requested Prescriptions     Pending Prescriptions Disp Refills    losartan (COZAAR) 25 MG tablet [Pharmacy Med Name: LOSARTAN POTASSIUM 25 MG TAB] 90 tablet 0     Sig: TAKE 1 TABLET BY MOUTH DAILY        Last Filled:  11/12/24    Patient Phone Number: [phone] (home)     Last appt: 11/27/2024 Return in about 6 months (around 5/27/2025) for blood pressure follow-up.  Next appt: Visit date not found    Last OARRS:        No data to display

## 2025-03-04 DIAGNOSIS — J43.2 CENTRILOBULAR EMPHYSEMA (HCC): Primary | ICD-10-CM

## 2025-03-04 RX ORDER — FLUTICASONE FUROATE AND VILANTEROL TRIFENATATE 100; 25 UG/1; UG/1
POWDER RESPIRATORY (INHALATION)
Qty: 60 EACH | Refills: 5 | Status: SHIPPED | OUTPATIENT
Start: 2025-03-04

## 2025-03-04 NOTE — TELEPHONE ENCOUNTER
Medication:   Requested Prescriptions     Pending Prescriptions Disp Refills    BREO ELLIPTA 100-25 MCG/ACT inhaler [Pharmacy Med Name: BREO ELLIPTA 100-25 MCG INHALR]       Sig: INHALE 1 DOSE BY MOUTH DAILY      9/6/24  Last Filled:      Patient Phone Number: [phone] (home)     Last appt: 11/27/2024  Return in about 6 months (around 5/27/2025) for blood pressure follow-up.  Next appt: Visit date not found    Last OARRS:        No data to display

## 2025-03-11 ENCOUNTER — TELEPHONE (OUTPATIENT)
Dept: PRIMARY CARE CLINIC | Age: 70
End: 2025-03-11

## 2025-03-11 DIAGNOSIS — J44.9 MODERATE COPD (CHRONIC OBSTRUCTIVE PULMONARY DISEASE) (HCC): Primary | ICD-10-CM

## 2025-03-11 RX ORDER — FLUTICASONE FUROATE AND VILANTEROL TRIFENATATE 200; 25 UG/1; UG/1
1 POWDER RESPIRATORY (INHALATION)
Qty: 60 EACH | Refills: 11 | Status: SHIPPED | OUTPATIENT
Start: 2025-03-11

## 2025-03-11 NOTE — TELEPHONE ENCOUNTER
Pt got letter from insurance that Sluticasone-vilanterol isnt not covered    But   Breoellitta is covered. Brand name  is covered.   Can be sent in

## 2025-03-28 ENCOUNTER — TELEPHONE (OUTPATIENT)
Dept: CASE MANAGEMENT | Age: 70
End: 2025-03-28

## 2025-03-28 DIAGNOSIS — Z87.891 HISTORY OF TOBACCO USE: Primary | ICD-10-CM

## 2025-03-28 NOTE — TELEPHONE ENCOUNTER
Dr. Morales,    Patient due for annual CT Lung screening. For your convenience, I have pended the order for the scan.  Please sign if you agree with annual screening for this pt.  I will help contact the pt to schedule.    Reminder letter mailed to pt.    Thanks,  Genoveva ZAMORANON, RN   Lung Nodule Navigator  [facility]  [phone]

## 2025-04-14 ENCOUNTER — TELEPHONE (OUTPATIENT)
Dept: PRIMARY CARE CLINIC | Age: 70
End: 2025-04-14

## 2025-05-05 ENCOUNTER — HOSPITAL ENCOUNTER (OUTPATIENT)
Dept: CT IMAGING | Age: 70
Discharge: HOME | End: 2025-05-05
Payer: MEDICARE

## 2025-05-05 ENCOUNTER — RESULTS FOLLOW-UP (OUTPATIENT)
Dept: PRIMARY CARE CLINIC | Age: 70
End: 2025-05-05

## 2025-05-05 DIAGNOSIS — Z87.891 HISTORY OF TOBACCO USE: ICD-10-CM

## 2025-05-05 PROCEDURE — 71271 CT THORAX LUNG CANCER SCR C-: CPT

## 2025-06-19 DIAGNOSIS — I25.10 CORONARY ARTERY DISEASE INVOLVING NATIVE CORONARY ARTERY OF NATIVE HEART WITHOUT ANGINA PECTORIS: ICD-10-CM

## 2025-06-19 DIAGNOSIS — E78.2 MIXED HYPERLIPIDEMIA: ICD-10-CM

## 2025-06-19 RX ORDER — ATORVASTATIN CALCIUM 40 MG/1
40 TABLET, FILM COATED ORAL DAILY
Qty: 90 TABLET | Refills: 3 | Status: SHIPPED | OUTPATIENT
Start: 2025-06-19

## 2025-06-19 NOTE — TELEPHONE ENCOUNTER
Medication:   Requested Prescriptions     Pending Prescriptions Disp Refills    atorvastatin (LIPITOR) 40 MG tablet 90 tablet 3     Sig: Take 1 tablet by mouth daily        Last Filled:  6/10/24    Patient Phone Number: [phone] (home)     Last appt: 11/27/2024   Return in about 6 months (around 5/27/2025) for blood pressure follow-up.  Next appt: 10/30/2025    Last OARRS:        No data to display

## 2025-06-19 NOTE — TELEPHONE ENCOUNTER
[facility] [id number] - [place], [place] - [address] - P [phone] - F [phone]  [address] [facility] [address]  Phone: [phone]  Fax: [phone]       atorvastatin (LIPITOR) 40 MG tablet     Pt called in for a refill.

## 2025-08-12 DIAGNOSIS — I10 PRIMARY HYPERTENSION: ICD-10-CM

## 2025-08-12 RX ORDER — LOSARTAN POTASSIUM 25 MG/1
25 TABLET ORAL DAILY
Qty: 90 TABLET | Refills: 1 | Status: SHIPPED | OUTPATIENT
Start: 2025-08-12